# Patient Record
Sex: FEMALE | Race: WHITE | Employment: FULL TIME | URBAN - METROPOLITAN AREA
[De-identification: names, ages, dates, MRNs, and addresses within clinical notes are randomized per-mention and may not be internally consistent; named-entity substitution may affect disease eponyms.]

---

## 2021-03-20 ENCOUNTER — HOSPITAL ENCOUNTER (EMERGENCY)
Age: 26
Discharge: HOME | End: 2021-03-20
Attending: EMERGENCY MEDICINE
Payer: COMMERCIAL

## 2021-03-20 ENCOUNTER — APPOINTMENT (OUTPATIENT)
Dept: ULTRASOUND IMAGING | Age: 26
End: 2021-03-20
Payer: COMMERCIAL

## 2021-03-20 VITALS
SYSTOLIC BLOOD PRESSURE: 118 MMHG | HEART RATE: 81 BPM | TEMPERATURE: 98.5 F | HEIGHT: 60 IN | RESPIRATION RATE: 16 BRPM | WEIGHT: 105 LBS | DIASTOLIC BLOOD PRESSURE: 81 MMHG | OXYGEN SATURATION: 99 % | BODY MASS INDEX: 20.62 KG/M2

## 2021-03-20 DIAGNOSIS — O26.90 COMPLICATION OF PREGNANCY, ANTEPARTUM: ICD-10-CM

## 2021-03-20 DIAGNOSIS — R10.9 LEFT FLANK PAIN: Primary | ICD-10-CM

## 2021-03-20 LAB
AMORPHOUS: ABNORMAL
ANION GAP SERPL CALCULATED.3IONS-SCNC: 7 MMOL/L (ref 7–16)
BACTERIA: ABNORMAL /HPF
BASOPHILS ABSOLUTE: 0.03 E9/L (ref 0–0.2)
BASOPHILS RELATIVE PERCENT: 0.5 % (ref 0–2)
BILIRUBIN URINE: NEGATIVE
BLOOD, URINE: NEGATIVE
BUN BLDV-MCNC: 10 MG/DL (ref 6–20)
CALCIUM SERPL-MCNC: 8.8 MG/DL (ref 8.6–10.2)
CHLORIDE BLD-SCNC: 107 MMOL/L (ref 98–107)
CLARITY: ABNORMAL
CO2: 24 MMOL/L (ref 22–29)
COLOR: YELLOW
CREAT SERPL-MCNC: 0.5 MG/DL (ref 0.5–1)
EOSINOPHILS ABSOLUTE: 0.24 E9/L (ref 0.05–0.5)
EOSINOPHILS RELATIVE PERCENT: 3.7 % (ref 0–6)
GFR AFRICAN AMERICAN: >60
GFR NON-AFRICAN AMERICAN: >60 ML/MIN/1.73
GLUCOSE BLD-MCNC: 100 MG/DL (ref 74–99)
GLUCOSE URINE: NEGATIVE MG/DL
GONADOTROPIN, CHORIONIC (HCG) QUANT: ABNORMAL MIU/ML
HCT VFR BLD CALC: 34.1 % (ref 34–48)
HEMOGLOBIN: 11.5 G/DL (ref 11.5–15.5)
IMMATURE GRANULOCYTES #: 0.02 E9/L
IMMATURE GRANULOCYTES %: 0.3 % (ref 0–5)
KETONES, URINE: NEGATIVE MG/DL
LEUKOCYTE ESTERASE, URINE: NEGATIVE
LYMPHOCYTES ABSOLUTE: 1.88 E9/L (ref 1.5–4)
LYMPHOCYTES RELATIVE PERCENT: 28.7 % (ref 20–42)
MCH RBC QN AUTO: 31.7 PG (ref 26–35)
MCHC RBC AUTO-ENTMCNC: 33.7 % (ref 32–34.5)
MCV RBC AUTO: 93.9 FL (ref 80–99.9)
MONOCYTES ABSOLUTE: 0.41 E9/L (ref 0.1–0.95)
MONOCYTES RELATIVE PERCENT: 6.3 % (ref 2–12)
NEUTROPHILS ABSOLUTE: 3.97 E9/L (ref 1.8–7.3)
NEUTROPHILS RELATIVE PERCENT: 60.5 % (ref 43–80)
NITRITE, URINE: NEGATIVE
PDW BLD-RTO: 12.7 FL (ref 11.5–15)
PH UA: 8 (ref 5–9)
PLATELET # BLD: 284 E9/L (ref 130–450)
PMV BLD AUTO: 9.5 FL (ref 7–12)
POTASSIUM SERPL-SCNC: 4.1 MMOL/L (ref 3.5–5)
PROTEIN UA: NEGATIVE MG/DL
RBC # BLD: 3.63 E12/L (ref 3.5–5.5)
RBC UA: ABNORMAL /HPF (ref 0–2)
SODIUM BLD-SCNC: 138 MMOL/L (ref 132–146)
SPECIFIC GRAVITY UA: 1.01 (ref 1–1.03)
UROBILINOGEN, URINE: 0.2 E.U./DL
WBC # BLD: 6.6 E9/L (ref 4.5–11.5)
WBC UA: ABNORMAL /HPF (ref 0–5)

## 2021-03-20 PROCEDURE — 85025 COMPLETE CBC W/AUTO DIFF WBC: CPT

## 2021-03-20 PROCEDURE — 84702 CHORIONIC GONADOTROPIN TEST: CPT

## 2021-03-20 PROCEDURE — 76801 OB US < 14 WKS SINGLE FETUS: CPT

## 2021-03-20 PROCEDURE — 6370000000 HC RX 637 (ALT 250 FOR IP): Performed by: EMERGENCY MEDICINE

## 2021-03-20 PROCEDURE — 99283 EMERGENCY DEPT VISIT LOW MDM: CPT

## 2021-03-20 PROCEDURE — 80048 BASIC METABOLIC PNL TOTAL CA: CPT

## 2021-03-20 PROCEDURE — 36415 COLL VENOUS BLD VENIPUNCTURE: CPT

## 2021-03-20 PROCEDURE — 81001 URINALYSIS AUTO W/SCOPE: CPT

## 2021-03-20 RX ORDER — ACETAMINOPHEN 325 MG/1
650 TABLET ORAL ONCE
Status: COMPLETED | OUTPATIENT
Start: 2021-03-20 | End: 2021-03-20

## 2021-03-20 RX ADMIN — ACETAMINOPHEN 650 MG: 325 TABLET ORAL at 11:43

## 2021-03-20 ASSESSMENT — PAIN SCALES - GENERAL
PAINLEVEL_OUTOF10: 6
PAINLEVEL_OUTOF10: 6

## 2021-03-20 ASSESSMENT — PAIN DESCRIPTION - DESCRIPTORS: DESCRIPTORS: ACHING

## 2021-03-20 ASSESSMENT — PAIN DESCRIPTION - LOCATION: LOCATION: ABDOMEN

## 2021-03-20 NOTE — ED PROVIDER NOTES
Department of Emergency Medicine   ED  Provider Note  Admit Date/RoomTime: 3/20/2021 10:44 AM  ED Room: 10/10          History of Present Illness:  3/20/21, Time: 11:24 AM EDT  Chief Complaint   Patient presents with    Abdominal Cramping     14 weeks pregnant, started yesterday. Denies N/V/D. Denies bleeding. Joel Mederos is a 31 y.o. female presenting to the ED for left abdominal pain in the presence of pregnancy, beginning 1 day. The complaint has been intermittent, moderate in severity, and worsened by movement and twisting. Patient presents for left lower abdominal pain/left flank pain. She is 14 weeks pregnant by dates, , follows with Dr. Gill Mclean. Reports she was lifting something heavy yesterday when she had sudden onset of left flank pain. Denies vaginal discharge vaginal bleeding. She is unsure of her blood type. Denies dysuria or hematuria. Is not take anything for it yet this morning. Has not yet felt the baby kick and move. Believes she had an ultrasound the office 2-1/2 to 3 weeks ago. Review  records shows a stroke a blood type of O+. Review of Systems:   Pertinent positives and negatives are stated within HPI, all other systems reviewed and are negative.        --------------------------------------------- PAST HISTORY ---------------------------------------------  Past Medical History:  has a past medical history of Anxiety, Benign brain tumor ([facility] [address] Depression, and Herpes. Past Surgical History:  has a past surgical history that includes Tonsillectomy and Adenoidectomy. Social History:  reports that she has been smoking cigarettes. She has been smoking about 0.25 packs per day. She has never used smokeless tobacco. She reports current alcohol use. She reports that she does not use drugs. Family History: family history includes Cancer in her paternal grandfather; Stroke in her mother. . Unless otherwise noted, family history is non contributory    The patients home medications have been reviewed. Allergies: Patient has no known allergies. ---------------------------------------------------PHYSICAL EXAM--------------------------------------    Constitutional/General: Alert and oriented x3  Head: Normocephalic and atraumatic  Eyes: PERRL, EOMI, sclera non icteric  Mouth: Oropharynx clear, handling secretions, no trismus, no asymmetry of the posterior oropharynx or uvular edema  Neck: Supple, full ROM, no stridor, no meningeal signs  Respiratory: Lungs clear to auscultation bilaterally,Not in respiratory distress  Cardiovascular:  Regular rate. Regular rhythm. 2+ distal pulses. Equal extremity pulses. Chest: No chest wall tenderness  GI:  Abdomen Soft, gravid abdomen. Tenderness along the left mid abdomen left lower quadrant. There is no CVA tenderness no rebound, guarding, or rigidity. Musculoskeletal: Moves all extremities x 4. Warm and well perfused, no clubbing, cyanosis, or edema. Capillary refill <3 seconds  Integument: skin warm and dry. No rashes. Neurologic: GCS 15, no focal deficits, symmetric strength 5/5 in the upper and lower extremities bilaterally  Psychiatric: Normal Affect       -------------------------------------------------- RESULTS -------------------------------------------------  I have personally reviewed all laboratory and imaging results for this patient. Results are listed below.      LABS: (Lab results interpreted by me)  Results for orders placed or performed during the hospital encounter of 03/20/21   Urinalysis   Result Value Ref Range    Color, UA Yellow Straw/Yellow    Clarity, UA CLOUDY (A) Clear    Glucose, Ur Negative Negative mg/dL    Bilirubin Urine Negative Negative    Ketones, Urine Negative Negative mg/dL    Specific Gravity, UA 1.015 1.005 - 1.030    Blood, Urine Negative Negative    pH, UA 8.0 5.0 - 9.0    Protein, UA Negative Negative mg/dL    Urobilinogen, Urine 0.2 <2.0 E.U./dL Nitrite, Urine Negative Negative    Leukocyte Esterase, Urine Negative Negative   CBC Auto Differential   Result Value Ref Range    WBC 6.6 4.5 - 11.5 E9/L    RBC 3.63 3.50 - 5.50 E12/L    Hemoglobin 11.5 11.5 - 15.5 g/dL    Hematocrit 34.1 34.0 - 48.0 %    MCV 93.9 80.0 - 99.9 fL    MCH 31.7 26.0 - 35.0 pg    MCHC 33.7 32.0 - 34.5 %    RDW 12.7 11.5 - 15.0 fL    Platelets 633 493 - 931 E9/L    MPV 9.5 7.0 - 12.0 fL    Neutrophils % 60.5 43.0 - 80.0 %    Immature Granulocytes % 0.3 0.0 - 5.0 %    Lymphocytes % 28.7 20.0 - 42.0 %    Monocytes % 6.3 2.0 - 12.0 %    Eosinophils % 3.7 0.0 - 6.0 %    Basophils % 0.5 0.0 - 2.0 %    Neutrophils Absolute 3.97 1.80 - 7.30 E9/L    Immature Granulocytes # 0.02 E9/L    Lymphocytes Absolute 1.88 1.50 - 4.00 E9/L    Monocytes Absolute 0.41 0.10 - 0.95 E9/L    Eosinophils Absolute 0.24 0.05 - 0.50 E9/L    Basophils Absolute 0.03 0.00 - 0.20 Q8/C   Basic Metabolic Panel   Result Value Ref Range    Sodium 138 132 - 146 mmol/L    Potassium 4.1 3.5 - 5.0 mmol/L    Chloride 107 98 - 107 mmol/L    CO2 24 22 - 29 mmol/L    Anion Gap 7 7 - 16 mmol/L    Glucose 100 (H) 74 - 99 mg/dL    BUN 10 6 - 20 mg/dL    CREATININE 0.5 0.5 - 1.0 mg/dL    GFR Non-African American >60 >=60 mL/min/1.73    GFR African American >60     Calcium 8.8 8.6 - 10.2 mg/dL   hCG, quantitative, pregnancy   Result Value Ref Range    hCG Quant 29187.0 (H) <10 mIU/mL   Microscopic Urinalysis   Result Value Ref Range    WBC, UA NONE 0 - 5 /HPF    RBC, UA NONE 0 - 2 /HPF    Bacteria, UA FEW (A) None Seen /HPF    Amorphous, UA MANY    ,       RADIOLOGY:  Interpreted by Radiologist unless otherwise specified  US OB LESS THAN 14 WEEKS SINGLE OR FIRST GESTATION   Final Result   Single live intrauterine gestation estimated at 13 weeks 4 days                          ------------------------- NURSING NOTES AND VITALS REVIEWED ---------------------------   The nursing notes within the ED encounter and vital signs as below have been reviewed by myself  /81   Pulse 81   Temp 98.5 °F (36.9 °C) (Oral)   Resp 16   Ht 5' (1.524 m)   Wt 105 lb (47.6 kg)   SpO2 99%   BMI 20.51 kg/m²     Oxygen Saturation Interpretation: Normal       The patients available past medical records and past encounters were reviewed. ------------------------------ ED COURSE/MEDICAL DECISION MAKING----------------------  Medications   acetaminophen (TYLENOL) tablet 650 mg (650 mg Oral Given 3/20/21 1143)                    Medical Decision Making:     I, Dr. Thomas Marie am the primary provider of record    Patient showed improvement after Tylenol. Work-up reassuring. Will discharge home with outpatient follow-up return Jovanna signs or symptoms. Re-Evaluations:          Re-evaluation. Patients symptoms are improving  Repeat physical examination is improved        This patient's ED course included: a personal history and physicial examination, re-evaluation prior to disposition and complex medical decision making and emergency management    This patient has remained hemodynamically stable during their ED course. Counseling: The emergency provider has spoken with the patient and spouse/SO and discussed todays results, in addition to providing specific details for the plan of care and counseling regarding the diagnosis and prognosis. Questions are answered at this time and they are agreeable with the plan.       --------------------------------- IMPRESSION AND DISPOSITION ---------------------------------    IMPRESSION  1. Left flank pain    2. Complication of pregnancy, antepartum        DISPOSITION  Disposition: Discharge to home  Patient condition is stable        NOTE: This report was transcribed using voice recognition software.  Every effort was made to ensure accuracy; however, inadvertent computerized transcription errors may be present        Karen Rangel DO  03/20/21 5317

## 2021-05-24 ENCOUNTER — ROUTINE PRENATAL (OUTPATIENT)
Dept: OBGYN CLINIC | Age: 26
End: 2021-05-24
Payer: COMMERCIAL

## 2021-05-24 ENCOUNTER — ANCILLARY PROCEDURE (OUTPATIENT)
Dept: OBGYN CLINIC | Age: 26
End: 2021-05-24
Payer: COMMERCIAL

## 2021-05-24 VITALS
HEART RATE: 80 BPM | BODY MASS INDEX: 25.64 KG/M2 | WEIGHT: 130.6 LBS | HEIGHT: 60 IN | DIASTOLIC BLOOD PRESSURE: 76 MMHG | SYSTOLIC BLOOD PRESSURE: 123 MMHG

## 2021-05-24 DIAGNOSIS — Z36.3 ENCOUNTER FOR ANTENATAL SCREENING FOR MALFORMATION USING ULTRASOUND: ICD-10-CM

## 2021-05-24 DIAGNOSIS — Z86.19 HISTORY OF HERPES GENITALIS: ICD-10-CM

## 2021-05-24 DIAGNOSIS — Z03.75 SUSPECTED SHORTENING OF CERVIX NOT FOUND: ICD-10-CM

## 2021-05-24 DIAGNOSIS — F12.90 MARIJUANA USE: ICD-10-CM

## 2021-05-24 DIAGNOSIS — Z3A.22 22 WEEKS GESTATION OF PREGNANCY: ICD-10-CM

## 2021-05-24 DIAGNOSIS — O09.891 MEDICATION EXPOSURE DURING FIRST TRIMESTER OF PREGNANCY: Primary | ICD-10-CM

## 2021-05-24 LAB
GLUCOSE URINE, POC: NEGATIVE
PROTEIN UA: NEGATIVE

## 2021-05-24 PROCEDURE — 99203 OFFICE O/P NEW LOW 30 MIN: CPT | Performed by: OBSTETRICS & GYNECOLOGY

## 2021-05-24 PROCEDURE — 81002 URINALYSIS NONAUTO W/O SCOPE: CPT | Performed by: OBSTETRICS & GYNECOLOGY

## 2021-05-24 PROCEDURE — G8427 DOCREV CUR MEDS BY ELIG CLIN: HCPCS | Performed by: OBSTETRICS & GYNECOLOGY

## 2021-05-24 PROCEDURE — 76811 OB US DETAILED SNGL FETUS: CPT | Performed by: OBSTETRICS & GYNECOLOGY

## 2021-05-24 PROCEDURE — G8419 CALC BMI OUT NRM PARAM NOF/U: HCPCS | Performed by: OBSTETRICS & GYNECOLOGY

## 2021-05-24 PROCEDURE — 4004F PT TOBACCO SCREEN RCVD TLK: CPT | Performed by: OBSTETRICS & GYNECOLOGY

## 2021-05-24 PROCEDURE — 76817 TRANSVAGINAL US OBSTETRIC: CPT | Performed by: OBSTETRICS & GYNECOLOGY

## 2021-05-24 NOTE — LETTER
21    Nohemi Goldberg MD  [address], [address],  [facility] [place] [address]     RE:  Daniel Serna  : 1995   AGE: 31 y.o. This report has been created using voice recognition software. It may contain errors which are inherent in voice recognition technology. Dear Dr. Jazmyn Dumont:    Elijah Harry had an appointment today for the following indications:    Patient Active Problem List   Diagnosis    History of benign brain tumor in 2013, not surgically resected    Encounter for  screening for malformation using ultrasound    Medication exposure during first trimester of pregnancy    Suspected shortening of cervix not found    History of herpes genitalis    Marijuana use     Elijah Harry is a 31 y.o. female, who is G2(0,0,,1,0). She has an Estimated Date of Delivery: 21 based on her previous ultrasound assessment. She is currently 22 weeks 6 days gestation based on that assessment. The patient has a history of marijuana use, cigarette smoking, verapamil use in the first trimester, and a history of a benign brain tumor not requiring surgical intervention. She did not know the specific nature of the brain tumor and no records were available for review at the time for assessment. She currently sees Dr. Cammie Yuan, a neurologist in [facility]. Records will be requested. The patient uses marijuana 2-3 times weekly. She was advised of the importance of not using illicit drugs, especially during her pregnancy. In-utero exposure to marijuana in some studies has been associated with an increased risk for neurobehavioral abnormalities, fetal growth abnormalities which may lead to low birth weight and ongoing growth problems following delivery and an increased risk for developing leukemia in children. The patient is a cigarette smoker. I advised the patient of the importance of discontinuing her smoking activity.  She was advised of the increased risk of  morbidity mosaicism cannot be distinguished, and in some cases, not detected by this method. A negative test does not eliminate the possibility of fetal chromosome abnormalities in regions tested or and other areas of the genome. The tests cannot rule out other genetic diseases or birth defects, including open neural tube defects. The patient should be placed on suppressive therapy for genital herpes once she reaches viability. This helps to reduce the risk of an active infection at the time of delivery. Suppressive therapy does not eliminate the risk of herpes virus transmission to the fetus. Many patients have asymptomatic genital herpes infections without visible evidence of infection. A fetal ultrasound evaluation was performed and a detailed report included in the EMR under the imaging tab. A living cottrell intrauterine fetus was identified, in the cephalic presentation, with normal fetal heart motion and normal fetal motion. The amniotic fluid volume was within normal limits. The placenta was anterior. The biometric measurements were consistent with the patient's established dating parameters, within the limits of error the test at the current gestational age. The estimated fetal weight was at the 46 growth percentile. No apparent gross fetal anatomic abnormalities were identified in the areas which were visualized.                      GENETIC SCREENING/TERATOLOGY COUNSELING                  (Includes patient, FTB, and any affected family members)    Patient Age > 35 Years NO   Thalassemia ( MVC<80) NO   Congential Heart Defect NO   Neural Tube Defect NO   Brandon-Sachs NO   Sickle Cell Disease NO   Sickle Cell Trait NO   Sickle C Disease or Trait NO   Hemophilia NO   Muscular Dystrophy NO   Cystic Fibrosis NO   Wes Disease NO   Autism NO   Mental Retardation NO   History of Fragile X NO   Maternal Diabetes NO   Other Genetic Disease or Syndrome NO   Previous Child With Congenital Abnormality Not follow-up assessment when they occur. I requested the patient return for a follow-up assessment in 8 weeks unless there is a clinical reason for her to return prior to that time. She is to call if she has any problems or questions prior to her next visit. Further evaluation and management will be dependent on her clinical presentation and the results of her testing. The patient is to continue to follow with you in your office for ongoing obstetric care. The total time in minutes spent with the patient, reviewing medical records, reviewing imaging studies, performing ultrasonic imaging, reviewing laboratory testing, and documenting information was 31 minutes, of which, 50% of the time was spent in patient education, counseling, and coordinating care with the patient, her provider, and/or her family. A complete medical, surgical, obstetric, family, and genetic history was obtained. I reviewed with the patient the results of her NIPT, including the limitations of the testing. I discussed with her her use of marijuana and cigarette smoking in pregnancy. I advised her of her increased risk for developing depressive symptomatology during her pregnancy and in the postpartum period because of her history of depression. I advised the patient that without knowing the specific nature of the brain tumor that she had, a specific recommendations for management in pregnancy could be made. Based on the CT scan performed on October 24, 2016, without contrast, there was no apparent mass-effect, edema or intracranial hemorrhage. A CT scan may not show all intracranial mass lesions. I advised the patient to continue to follow with Dr. Janine Ames for ongoing evaluation and management of this problem. I answered all of her questions to her satisfaction. I asked her to call if she had any additional questions prior to her next visit.       If you have any questions regarding her management, please contact me at your convenience and thank you for allowing me to participate in her care.     Sincerely,        Sada Vera MD, MS, Abel Lozano RDCS, RDMS, RVT  Director [address] [place] [address]  [phone]

## 2021-09-02 ENCOUNTER — HOSPITAL ENCOUNTER (OUTPATIENT)
Age: 26
Discharge: HOME | End: 2021-09-02
Attending: OBSTETRICS & GYNECOLOGY | Admitting: OBSTETRICS & GYNECOLOGY
Payer: COMMERCIAL

## 2021-09-02 VITALS
BODY MASS INDEX: 25.51 KG/M2 | SYSTOLIC BLOOD PRESSURE: 130 MMHG | RESPIRATION RATE: 16 BRPM | DIASTOLIC BLOOD PRESSURE: 77 MMHG | TEMPERATURE: 98.2 F | HEIGHT: 60 IN | HEART RATE: 81 BPM

## 2021-09-02 PROBLEM — Z3A.37 37 WEEKS GESTATION OF PREGNANCY: Status: ACTIVE | Noted: 2021-09-02

## 2021-09-02 PROBLEM — Z34.03 PRIMIPARITY, THIRD TRIMESTER: Status: ACTIVE | Noted: 2021-09-02

## 2021-09-02 LAB
ALBUMIN SERPL-MCNC: 3.9 G/DL (ref 3.5–5.2)
ALP BLD-CCNC: 204 U/L (ref 35–104)
ALT SERPL-CCNC: 16 U/L (ref 0–32)
ANION GAP SERPL CALCULATED.3IONS-SCNC: 13 MMOL/L (ref 7–16)
AST SERPL-CCNC: 17 U/L (ref 0–31)
BACTERIA: ABNORMAL /HPF
BILIRUB SERPL-MCNC: <0.2 MG/DL (ref 0–1.2)
BILIRUBIN URINE: NEGATIVE
BLOOD, URINE: NEGATIVE
BUN BLDV-MCNC: 7 MG/DL (ref 6–20)
CALCIUM SERPL-MCNC: 9.5 MG/DL (ref 8.6–10.2)
CHLORIDE BLD-SCNC: 102 MMOL/L (ref 98–107)
CLARITY: CLEAR
CO2: 20 MMOL/L (ref 22–29)
COLOR: YELLOW
CREAT SERPL-MCNC: 0.6 MG/DL (ref 0.5–1)
EPITHELIAL CELLS, UA: ABNORMAL /HPF
GFR AFRICAN AMERICAN: >60
GFR NON-AFRICAN AMERICAN: >60 ML/MIN/1.73
GLUCOSE BLD-MCNC: 102 MG/DL (ref 74–99)
GLUCOSE URINE: NEGATIVE MG/DL
HCT VFR BLD CALC: 34.9 % (ref 34–48)
HEMOGLOBIN: 11.7 G/DL (ref 11.5–15.5)
KETONES, URINE: NEGATIVE MG/DL
LEUKOCYTE ESTERASE, URINE: NEGATIVE
MCH RBC QN AUTO: 31.4 PG (ref 26–35)
MCHC RBC AUTO-ENTMCNC: 33.5 % (ref 32–34.5)
MCV RBC AUTO: 93.6 FL (ref 80–99.9)
NITRITE, URINE: NEGATIVE
PDW BLD-RTO: 13.5 FL (ref 11.5–15)
PH UA: 7 (ref 5–9)
PLATELET # BLD: 373 E9/L (ref 130–450)
PMV BLD AUTO: 9.7 FL (ref 7–12)
POTASSIUM SERPL-SCNC: 4.2 MMOL/L (ref 3.5–5)
PROTEIN UA: NEGATIVE MG/DL
RBC # BLD: 3.73 E12/L (ref 3.5–5.5)
RBC UA: ABNORMAL /HPF (ref 0–2)
SODIUM BLD-SCNC: 135 MMOL/L (ref 132–146)
SPECIFIC GRAVITY UA: 1.01 (ref 1–1.03)
TOTAL PROTEIN: 7.3 G/DL (ref 6.4–8.3)
URIC ACID, SERUM: 3.9 MG/DL (ref 2.4–5.7)
UROBILINOGEN, URINE: 0.2 E.U./DL
WBC # BLD: 14.2 E9/L (ref 4.5–11.5)
WBC UA: ABNORMAL /HPF (ref 0–5)

## 2021-09-02 PROCEDURE — 84550 ASSAY OF BLOOD/URIC ACID: CPT

## 2021-09-02 PROCEDURE — 99211 OFF/OP EST MAY X REQ PHY/QHP: CPT

## 2021-09-02 PROCEDURE — 85027 COMPLETE CBC AUTOMATED: CPT

## 2021-09-02 PROCEDURE — 80053 COMPREHEN METABOLIC PANEL: CPT

## 2021-09-02 PROCEDURE — 36415 COLL VENOUS BLD VENIPUNCTURE: CPT

## 2021-09-02 PROCEDURE — 81001 URINALYSIS AUTO W/SCOPE: CPT

## 2021-09-02 NOTE — H&P
Department of Obstetrics and Gynecology  Attending Obstetrics History and Physical      HISTORY OF PRESENT ILLNESS:      The patient is a 31 y.o.  1 parity 0 at 37 weeks 3 days. Sent from office for elevated bp. No pih symptoms. Denies headaches or visual changes. Estimated Due Date:  21  Contractions:  Yes  Leaking of fluid: no  nfm  Blleeding:  No    PRENATAL CARE:    Complications: No      Active Problems:    37 weeks gestation of pregnancy  Resolved Problems:    * No resolved hospital problems. *        PAST OB HISTORY    OB History    Para Term  AB Living   1             SAB TAB Ectopic Molar Multiple Live Births                    # Outcome Date GA Lbr Chavez/2nd Weight Sex Delivery Anes PTL Lv   1 Current                    Pre-eclampsia:  No      :  No      D & C:  No      Cerclage:  No      LEEP:  No      Myomectomy:  No       Labor: No    Past Medical History:    Past Medical History:   Diagnosis Date    Anxiety     Benign brain tumor ([facility] [address]     gets annual MRI    Depression     Herpes         Past Surgical History:    Past Surgical History:   Procedure Laterality Date    ADENOIDECTOMY      TONSILLECTOMY          Past Family History:  Family History   Problem Relation Age of Onset    Stroke Mother     Heart Attack Mother     Cancer Paternal Grandfather         liver    Cancer Maternal Grandfather     Kidney Disease Father        ROS:  Const: No fever, chills, night sweats, no recent unexplained weight gain/loss  HEENT: No blurred vision, double vision; no ear problems; no sore throat, congestion; no running nose. Resp: No cough, no sputum, no pleuritic chest pain, no sob  Cardio: No chest pain, no exertional dyspnea, no PND, no orthopnea, no palpitation, no leg swelling. GI: No dysphagia, no reflux; no abdominal pain, no n/v; no c/d.  No hematochezia    : No dysuria, no frequency, hesitancy; no hematuria  MSK: no joint pain, no myalgia, no change in ROM  Neuro: no focal weakness in extremities, no slurred speech, no double vision, no numbness or tingling in extremities  Endo: no heat/cold intolerance, no polyphagia, polydipsia or polyuria  Hem: no increased bleeding, no bruising, no lymphadenopathy  Skin: no skin changes  Psych: no depressed mood, no suicidal ideation    Social History:     reports that she has quit smoking. Her smoking use included cigarettes. She has a 2.00 pack-year smoking history. She has never used smokeless tobacco. She reports previous alcohol use. She reports current drug use. Drug: Marijuana. Medications Prior to Admission:  Medications Prior to Admission: Prenatal MV-Min-Fe Fum-FA-DHA (PRENATAL 1 PO), Take by mouth    Allergies:  Patient has no known allergies. PHYSICAL EXAM:  /77   Pulse 81   Temp 98.2 °F (36.8 °C) (Oral)   Resp 16   Ht 5' (1.524 m)   LMP 12/14/2020   BMI 25.51 kg/m²    Xw=304/75, 131/77,141/99  General appearance: Comfortable  Lungs:  CTA   Heart:  Regular Rhythm  Abdomen:  Soft, non-tender, gravid  Fetal heart rate:   reactive    Contraction frequency:   Some ctx's doesn't feel  Membranes:  Intact      ASSESSMENT     IUP at 37 weeks.     Elevated bp in office         Plan: orders called in by dr Pierce Schmidt        Electronically signed by Devante Li MD on 9/2/2021 at 6:14 PM

## 2021-09-02 NOTE — PROGRESS NOTES
Patient sent in from Dr. Gaming [facility] office for elevated BPs. Orders received. Lab work drawn and sent. BPs cycling every 15 mins.

## 2021-09-11 ENCOUNTER — ANESTHESIA (OUTPATIENT)
Dept: LABOR AND DELIVERY | Age: 26
DRG: 560 | End: 2021-09-11
Payer: COMMERCIAL

## 2021-09-11 ENCOUNTER — HOSPITAL ENCOUNTER (INPATIENT)
Age: 26
LOS: 2 days | Discharge: HOME | DRG: 560 | End: 2021-09-13
Attending: OBSTETRICS & GYNECOLOGY | Admitting: OBSTETRICS & GYNECOLOGY
Payer: COMMERCIAL

## 2021-09-11 ENCOUNTER — ANESTHESIA EVENT (OUTPATIENT)
Dept: LABOR AND DELIVERY | Age: 26
DRG: 560 | End: 2021-09-11
Payer: COMMERCIAL

## 2021-09-11 PROBLEM — Z3A.38 38 WEEKS GESTATION OF PREGNANCY: Status: ACTIVE | Noted: 2021-09-11

## 2021-09-11 LAB
ABO/RH: NORMAL
ANTIBODY SCREEN: NORMAL
HCT VFR BLD CALC: 23.9 % (ref 34–48)
HEMOGLOBIN: 8 G/DL (ref 11.5–15.5)
MCH RBC QN AUTO: 31.9 PG (ref 26–35)
MCHC RBC AUTO-ENTMCNC: 33.5 % (ref 32–34.5)
MCV RBC AUTO: 95.2 FL (ref 80–99.9)
PDW BLD-RTO: 13.6 FL (ref 11.5–15)
PLATELET # BLD: 157 E9/L (ref 130–450)
PMV BLD AUTO: 11.3 FL (ref 7–12)
RBC # BLD: 2.51 E12/L (ref 3.5–5.5)
WBC # BLD: 13.4 E9/L (ref 4.5–11.5)

## 2021-09-11 PROCEDURE — 10907ZC DRAINAGE OF AMNIOTIC FLUID, THERAPEUTIC FROM PRODUCTS OF CONCEPTION, VIA NATURAL OR ARTIFICIAL OPENING: ICD-10-PCS | Performed by: OBSTETRICS & GYNECOLOGY

## 2021-09-11 PROCEDURE — 86850 RBC ANTIBODY SCREEN: CPT

## 2021-09-11 PROCEDURE — 1220000000 HC SEMI PRIVATE OB R&B

## 2021-09-11 PROCEDURE — 36415 COLL VENOUS BLD VENIPUNCTURE: CPT

## 2021-09-11 PROCEDURE — 6370000000 HC RX 637 (ALT 250 FOR IP): Performed by: OBSTETRICS & GYNECOLOGY

## 2021-09-11 PROCEDURE — 99024 POSTOP FOLLOW-UP VISIT: CPT | Performed by: OBSTETRICS & GYNECOLOGY

## 2021-09-11 PROCEDURE — 2500000003 HC RX 250 WO HCPCS: Performed by: ANESTHESIOLOGY

## 2021-09-11 PROCEDURE — 86900 BLOOD TYPING SEROLOGIC ABO: CPT

## 2021-09-11 PROCEDURE — 6360000002 HC RX W HCPCS: Performed by: OBSTETRICS & GYNECOLOGY

## 2021-09-11 PROCEDURE — 7200000001 HC VAGINAL DELIVERY

## 2021-09-11 PROCEDURE — 88307 TISSUE EXAM BY PATHOLOGIST: CPT

## 2021-09-11 PROCEDURE — 2500000003 HC RX 250 WO HCPCS

## 2021-09-11 PROCEDURE — 85027 COMPLETE CBC AUTOMATED: CPT

## 2021-09-11 PROCEDURE — 2580000003 HC RX 258: Performed by: OBSTETRICS & GYNECOLOGY

## 2021-09-11 PROCEDURE — 3700000025 EPIDURAL BLOCK: Performed by: ANESTHESIOLOGY

## 2021-09-11 PROCEDURE — 6360000002 HC RX W HCPCS

## 2021-09-11 PROCEDURE — 86901 BLOOD TYPING SEROLOGIC RH(D): CPT

## 2021-09-11 RX ORDER — SODIUM CHLORIDE 9 MG/ML
25 INJECTION, SOLUTION INTRAVENOUS PRN
Status: DISCONTINUED | OUTPATIENT
Start: 2021-09-11 | End: 2021-09-13 | Stop reason: HOSPADM

## 2021-09-11 RX ORDER — DOCUSATE SODIUM 100 MG/1
100 CAPSULE, LIQUID FILLED ORAL 2 TIMES DAILY
Status: DISCONTINUED | OUTPATIENT
Start: 2021-09-11 | End: 2021-09-13 | Stop reason: HOSPADM

## 2021-09-11 RX ORDER — NALOXONE HYDROCHLORIDE 0.4 MG/ML
0.4 INJECTION, SOLUTION INTRAMUSCULAR; INTRAVENOUS; SUBCUTANEOUS PRN
Status: DISCONTINUED | OUTPATIENT
Start: 2021-09-11 | End: 2021-09-11

## 2021-09-11 RX ORDER — ONDANSETRON 2 MG/ML
4 INJECTION INTRAMUSCULAR; INTRAVENOUS EVERY 6 HOURS PRN
Status: DISCONTINUED | OUTPATIENT
Start: 2021-09-11 | End: 2021-09-11

## 2021-09-11 RX ORDER — ACETAMINOPHEN 325 MG/1
650 TABLET ORAL EVERY 4 HOURS PRN
Status: DISCONTINUED | OUTPATIENT
Start: 2021-09-11 | End: 2021-09-13 | Stop reason: HOSPADM

## 2021-09-11 RX ORDER — HYDROCODONE BITARTRATE AND ACETAMINOPHEN 5; 325 MG/1; MG/1
1 TABLET ORAL EVERY 4 HOURS PRN
Status: DISCONTINUED | OUTPATIENT
Start: 2021-09-11 | End: 2021-09-13 | Stop reason: HOSPADM

## 2021-09-11 RX ORDER — HYDROCODONE BITARTRATE AND ACETAMINOPHEN 5; 325 MG/1; MG/1
2 TABLET ORAL EVERY 4 HOURS PRN
Status: DISCONTINUED | OUTPATIENT
Start: 2021-09-11 | End: 2021-09-13 | Stop reason: HOSPADM

## 2021-09-11 RX ORDER — SODIUM CHLORIDE, SODIUM LACTATE, POTASSIUM CHLORIDE, CALCIUM CHLORIDE 600; 310; 30; 20 MG/100ML; MG/100ML; MG/100ML; MG/100ML
INJECTION, SOLUTION INTRAVENOUS CONTINUOUS
Status: DISCONTINUED | OUTPATIENT
Start: 2021-09-11 | End: 2021-09-13 | Stop reason: HOSPADM

## 2021-09-11 RX ORDER — FENTANYL CITRATE 50 UG/ML
INJECTION, SOLUTION INTRAMUSCULAR; INTRAVENOUS PRN
Status: DISCONTINUED | OUTPATIENT
Start: 2021-09-11 | End: 2021-09-11 | Stop reason: SDUPTHER

## 2021-09-11 RX ORDER — BUPIVACAINE HYDROCHLORIDE 2.5 MG/ML
INJECTION, SOLUTION EPIDURAL; INFILTRATION; INTRACAUDAL PRN
Status: DISCONTINUED | OUTPATIENT
Start: 2021-09-11 | End: 2021-09-11 | Stop reason: SDUPTHER

## 2021-09-11 RX ORDER — SODIUM CHLORIDE, SODIUM LACTATE, POTASSIUM CHLORIDE, AND CALCIUM CHLORIDE .6; .31; .03; .02 G/100ML; G/100ML; G/100ML; G/100ML
500 INJECTION, SOLUTION INTRAVENOUS PRN
Status: DISCONTINUED | OUTPATIENT
Start: 2021-09-11 | End: 2021-09-11

## 2021-09-11 RX ORDER — SODIUM CHLORIDE, SODIUM LACTATE, POTASSIUM CHLORIDE, CALCIUM CHLORIDE 600; 310; 30; 20 MG/100ML; MG/100ML; MG/100ML; MG/100ML
INJECTION, SOLUTION INTRAVENOUS CONTINUOUS
Status: DISCONTINUED | OUTPATIENT
Start: 2021-09-11 | End: 2021-09-11

## 2021-09-11 RX ORDER — SODIUM CHLORIDE 0.9 % (FLUSH) 0.9 %
5-40 SYRINGE (ML) INJECTION PRN
Status: DISCONTINUED | OUTPATIENT
Start: 2021-09-11 | End: 2021-09-13 | Stop reason: HOSPADM

## 2021-09-11 RX ORDER — IBUPROFEN 800 MG/1
800 TABLET ORAL EVERY 8 HOURS PRN
Status: DISCONTINUED | OUTPATIENT
Start: 2021-09-11 | End: 2021-09-13 | Stop reason: HOSPADM

## 2021-09-11 RX ORDER — NALBUPHINE HCL 10 MG/ML
5 AMPUL (ML) INJECTION EVERY 4 HOURS PRN
Status: DISCONTINUED | OUTPATIENT
Start: 2021-09-11 | End: 2021-09-11

## 2021-09-11 RX ORDER — FERROUS SULFATE 325(65) MG
325 TABLET ORAL 2 TIMES DAILY WITH MEALS
Status: DISCONTINUED | OUTPATIENT
Start: 2021-09-12 | End: 2021-09-13 | Stop reason: HOSPADM

## 2021-09-11 RX ORDER — MODIFIED LANOLIN
OINTMENT (GRAM) TOPICAL PRN
Status: DISCONTINUED | OUTPATIENT
Start: 2021-09-11 | End: 2021-09-13 | Stop reason: HOSPADM

## 2021-09-11 RX ORDER — VALACYCLOVIR HYDROCHLORIDE 500 MG/1
500 TABLET, FILM COATED ORAL DAILY
Status: ON HOLD | COMMUNITY
End: 2021-09-13 | Stop reason: HOSPADM

## 2021-09-11 RX ORDER — TERBUTALINE SULFATE 1 MG/ML
INJECTION, SOLUTION SUBCUTANEOUS
Status: DISCONTINUED
Start: 2021-09-11 | End: 2021-09-11

## 2021-09-11 RX ORDER — SODIUM CHLORIDE, SODIUM LACTATE, POTASSIUM CHLORIDE, AND CALCIUM CHLORIDE .6; .31; .03; .02 G/100ML; G/100ML; G/100ML; G/100ML
1000 INJECTION, SOLUTION INTRAVENOUS PRN
Status: DISCONTINUED | OUTPATIENT
Start: 2021-09-11 | End: 2021-09-11

## 2021-09-11 RX ORDER — SODIUM CHLORIDE 0.9 % (FLUSH) 0.9 %
5-40 SYRINGE (ML) INJECTION EVERY 12 HOURS SCHEDULED
Status: DISCONTINUED | OUTPATIENT
Start: 2021-09-11 | End: 2021-09-13 | Stop reason: HOSPADM

## 2021-09-11 RX ADMIN — FENTANYL CITRATE 100 MCG: 50 INJECTION, SOLUTION INTRAMUSCULAR; INTRAVENOUS at 16:15

## 2021-09-11 RX ADMIN — Medication 15 ML/HR: at 15:25

## 2021-09-11 RX ADMIN — DOCUSATE SODIUM 100 MG: 100 CAPSULE ORAL at 22:09

## 2021-09-11 RX ADMIN — SODIUM CHLORIDE, POTASSIUM CHLORIDE, SODIUM LACTATE AND CALCIUM CHLORIDE: 600; 310; 30; 20 INJECTION, SOLUTION INTRAVENOUS at 14:15

## 2021-09-11 RX ADMIN — BUPIVACAINE HYDROCHLORIDE 8 ML: 2.5 INJECTION, SOLUTION EPIDURAL; INFILTRATION; INTRACAUDAL; PERINEURAL at 16:16

## 2021-09-11 RX ADMIN — Medication 166.7 ML: at 20:15

## 2021-09-11 RX ADMIN — HYDROCODONE BITARTRATE AND ACETAMINOPHEN 1 TABLET: 5; 325 TABLET ORAL at 23:40

## 2021-09-11 RX ADMIN — IBUPROFEN 800 MG: 800 TABLET, FILM COATED ORAL at 22:09

## 2021-09-11 ASSESSMENT — PAIN DESCRIPTION - PAIN TYPE: TYPE: ACUTE PAIN

## 2021-09-11 ASSESSMENT — PAIN DESCRIPTION - PROGRESSION: CLINICAL_PROGRESSION: GRADUALLY WORSENING

## 2021-09-11 ASSESSMENT — PAIN - FUNCTIONAL ASSESSMENT: PAIN_FUNCTIONAL_ASSESSMENT: ACTIVITIES ARE NOT PREVENTED

## 2021-09-11 ASSESSMENT — PAIN DESCRIPTION - DESCRIPTORS: DESCRIPTORS: CRAMPING;TENDER

## 2021-09-11 ASSESSMENT — ENCOUNTER SYMPTOMS: SHORTNESS OF BREATH: 0

## 2021-09-11 ASSESSMENT — PAIN DESCRIPTION - ONSET: ONSET: GRADUAL

## 2021-09-11 ASSESSMENT — PAIN SCALES - GENERAL
PAINLEVEL_OUTOF10: 2
PAINLEVEL_OUTOF10: 6

## 2021-09-11 ASSESSMENT — PAIN DESCRIPTION - LOCATION: LOCATION: PERINEUM;ABDOMEN

## 2021-09-11 ASSESSMENT — PAIN DESCRIPTION - FREQUENCY: FREQUENCY: INTERMITTENT

## 2021-09-11 ASSESSMENT — PAIN DESCRIPTION - ORIENTATION: ORIENTATION: LOWER;MID

## 2021-09-11 NOTE — PROGRESS NOTES
Patient actively pushing. RN and physician remains in continuous attendance at the bedside. Assessment & evaluation of fetal heart rate, ongoing via continuous EFM. '

## 2021-09-11 NOTE — ANESTHESIA PRE PROCEDURE
Department of Anesthesiology  Preprocedure Note       Name:  Mare Hilliard   Age:  31 y.o.  :  1995                                          MRN:  [id number]         Date:  2021      Surgeon: * No surgeons listed *    Procedure: * No procedures listed *    Medications prior to admission:   Prior to Admission medications    Medication Sig Start Date End Date Taking? Authorizing Provider   valACYclovir (VALTREX) 500 MG tablet Take 500 mg by mouth daily   Yes Historical Provider, MD   Prenatal MV-Min-Fe Fum-FA-DHA (PRENATAL 1 PO) Take by mouth   Yes Historical Provider, MD       Current medications:    No current facility-administered medications for this encounter.        Allergies:  No Known Allergies    Problem List:    Patient Active Problem List   Diagnosis Code    Depression F32.9    Benign brain tumor ([facility] [address] D33.2    Anxiety F41.9    Encounter for  screening for malformation using ultrasound Z36.3    Medication exposure during first trimester of pregnancy O09.891    Suspected shortening of cervix not found Z03.75    History of herpes genitalis Z86.19    Marijuana use F12.90    37 weeks gestation of pregnancy Z3A.37    Primiparity, third trimester Z34.03    38 weeks gestation of pregnancy Z3A.38       Past Medical History:        Diagnosis Date    Anxiety     Benign brain tumor ([facility] [address]     gets annual MRI    Depression     Herpes        Past Surgical History:        Procedure Laterality Date    ADENOIDECTOMY      TONSILLECTOMY         Social History:    Social History     Tobacco Use    Smoking status: Former Smoker     Packs/day: 0.25     Years: 8.00     Pack years: 2.00     Types: Cigarettes    Smokeless tobacco: Never Used    Tobacco comment: quit when she found out she was pregnant   Substance Use Topics    Alcohol use: Not Currently     Comment: occasionally                                Counseling given: Not Answered  Comment: quit when she found out she was pregnant      Vital Signs (Current):   Vitals:    09/11/21 1334 09/11/21 1345   BP:  139/88   Pulse:  85   Weight: 160 lb (72.6 kg)    Height: 5' (1.524 m)                                               BP Readings from Last 3 Encounters:   09/11/21 139/88   09/02/21 130/77   05/24/21 123/76       NPO Status: Time of last liquid consumption: 1200                        Time of last solid consumption: 1200                        Date of last liquid consumption: 09/11/21                        Date of last solid food consumption: 09/11/21    BMI:   Wt Readings from Last 3 Encounters:   09/11/21 160 lb (72.6 kg)   05/24/21 130 lb 9.6 oz (59.2 kg)   03/20/21 105 lb (47.6 kg)     Body mass index is 31.25 kg/m². CBC:   Lab Results   Component Value Date    WBC 14.2 09/02/2021    RBC 3.73 09/02/2021    HGB 11.7 09/02/2021    HCT 34.9 09/02/2021    MCV 93.6 09/02/2021    RDW 13.5 09/02/2021     09/02/2021       CMP:   Lab Results   Component Value Date     09/02/2021    K 4.2 09/02/2021     09/02/2021    CO2 20 09/02/2021    BUN 7 09/02/2021    CREATININE 0.6 09/02/2021    GFRAA >60 09/02/2021    LABGLOM >60 09/02/2021    GLUCOSE 102 09/02/2021    GLUCOSE 86 03/01/2011    PROT 7.3 09/02/2021    CALCIUM 9.5 09/02/2021    BILITOT <0.2 09/02/2021    ALKPHOS 204 09/02/2021    AST 17 09/02/2021    ALT 16 09/02/2021       POC Tests: No results for input(s): POCGLU, POCNA, POCK, POCCL, POCBUN, POCHEMO, POCHCT in the last 72 hours.     Coags: No results found for: PROTIME, INR, APTT    HCG (If Applicable):   Lab Results   Component Value Date    PREGTESTUR positive 11/28/2017    PREGSERUM NEGATIVE 03/01/2011        ABGs: No results found for: PHART, PO2ART, YFM7UDK, OWE4YWH, BEART, G8ZOKVUR     Type & Screen (If Applicable):  No results found for: LABABO, LABRH    Drug/Infectious Status (If Applicable):  No results found for: HIV, HEPCAB    COVID-19 Screening (If Applicable): No results found for: COVID19        Anesthesia Evaluation  Patient summary reviewed and Nursing notes reviewed no history of anesthetic complications:   Airway: Mallampati: II  TM distance: >3 FB   Neck ROM: full  Mouth opening: > = 3 FB Dental:          Pulmonary: breath sounds clear to auscultation      (-) shortness of breath                          ROS comment: Former 2 pack year smoker, quit with pregnancy   Cardiovascular:Negative CV ROS            Rhythm: regular  Rate: normal                    Neuro/Psych:   (+) headaches:, psychiatric history: stable without treatmentdepression/anxiety              ROS comment: Brain lesion, chronic n/v, migraines. GI/Hepatic/Renal: Neg GI/Hepatic/Renal ROS            Endo/Other: Negative Endo/Other ROS                    Abdominal:             Vascular: Other Findings:             Anesthesia Plan      general, spinal and epidural     ASA 2             Anesthetic plan and risks discussed with patient. Use of blood products discussed with patient whom consented to blood products.                    EVELIN Douglass - CRNA   9/11/2021

## 2021-09-11 NOTE — PROGRESS NOTES
Patient actively pushing. RN and physician remains in continuous attendance at the bedside. Assessment & evaluation of fetal heart rate, ongoing via continuous EFM.     IV fluid bolus infusing, pushing every other ctx q 2-6, o2 by mask

## 2021-09-11 NOTE — PROGRESS NOTES
I was asked to perform artificial rupture of membranes on this laboring patient. Blood pressure 139/88, pulse 85, height 5' (1.524 m), weight 160 lb (72.6 kg), last menstrual period 12/14/2020. Fetal heart tones: Reactive before and after rupture of membranes  Cervix: 5 cm, 90%, vertex at S- 1.   Thick meconium stained amniotic fluid noted  [place]: Contractions every 3 4 minutes

## 2021-09-11 NOTE — PROGRESS NOTES
Dr Ashok Sierra notified pt 7 cm at this time. Pt was ruptured at [facility] for meconium stained fluid.  nicu to attend delivery

## 2021-09-11 NOTE — PROGRESS NOTES
Pt presents to antepartum  38weeks 5 days, complaining of decrease fetal movement, and lower back pain. Denies any lof,vb. Placed on monitor.  Vitals taken

## 2021-09-11 NOTE — PROGRESS NOTES
Patient actively pushing. RN remains in continuous attendance at the bedside. Assessment & evaluation of fetal heart rate, ongoing via continuous EFM.     Physician in house

## 2021-09-11 NOTE — ANESTHESIA PROCEDURE NOTES
Epidural Block    Patient location during procedure: OB  Start time: 9/11/2021 3:20 PM  End time: 9/11/2021 3:35 PM  Reason for block: labor epidural  Staffing  Performed: other anesthesia staff   Anesthesiologist: Ethan Lopez MD  Resident/CRNA: Bar Moss APRN - CRNA  Other anesthesia staff: Bahman Thomas RN  Preanesthetic Checklist  Completed: patient identified, IV checked, site marked, risks and benefits discussed, surgical consent, monitors and equipment checked, pre-op evaluation, timeout performed, anesthesia consent given, oxygen available and patient being monitored  Epidural  Patient position: sitting  Prep: ChloraPrep  Patient monitoring: cardiac monitor, continuous pulse ox and frequent blood pressure checks  Approach: midline  Location: lumbar (1-5)  Injection technique: FERCHO air  Provider prep: mask and sterile gloves  Needle  Needle type: Tuohy   Needle gauge: 18 G  Needle length: 2.5 in  Needle insertion depth: 5 cm  Catheter type: end hole  Catheter size: 20 G.   Catheter at skin depth: 11 cm  Test dose: negative  Assessment  Hemodynamics: stable  Attempts: 1

## 2021-09-11 NOTE — H&P
Subjective:      Rick Weaver is an 31 y.o. female at 45 and 5/7 weeks gestation presenting with decreased fetal movements. She is also complaining of contractions every 3 minutes lasting 30 seconds. Patient was checked in the office yesterday and she was 4 cm dilated. Denies any bleeding or fluid leak. Other associated symptoms include low back pain. Fetal Movement: normal.  Past Medical History:   Diagnosis Date    Anxiety     Benign brain tumor [facility])     gets annual MRI    Depression     Herpes        Review of Systems  Pertinent items are noted in HPI. Objective:      LMP 12/14/2020   Last menstrual period 12/14/2020. General:   alert, appears stated age and cooperative   Cervix:   5 cm dilated 80% effaced vertex at S- 2 with bulging membranes.    FHT:   140 and reactive     Lab Review    CBC:   Lab Results   Component Value Date    WBC 14.2 09/02/2021    RBC 3.73 09/02/2021    HGB 11.7 09/02/2021    HCT 34.9 09/02/2021    MCV 93.6 09/02/2021    MCH 31.4 09/02/2021    MCHC 33.5 09/02/2021    RDW 13.5 09/02/2021     09/02/2021    MPV 9.7 09/02/2021     CMP:    Lab Results   Component Value Date     09/02/2021    K 4.2 09/02/2021     09/02/2021    CO2 20 09/02/2021    BUN 7 09/02/2021    CREATININE 0.6 09/02/2021    GFRAA >60 09/02/2021    LABGLOM >60 09/02/2021    GLUCOSE 102 09/02/2021    GLUCOSE 86 03/01/2011    PROT 7.3 09/02/2021    LABALBU 3.9 09/02/2021    LABALBU 5.2 03/01/2011    CALCIUM 9.5 09/02/2021    BILITOT <0.2 09/02/2021    ALKPHOS 204 09/02/2021    AST 17 09/02/2021    ALT 16 09/02/2021     U/A:    Lab Results   Component Value Date    COLORU Yellow 09/02/2021    PHUR 7.0 09/02/2021    WBCUA NONE 09/02/2021    WBCUA >20 03/17/2011    RBCUA NONE 09/02/2021    RBCUA 5-10 03/17/2011    BACTERIA RARE 09/02/2021    CLARITYU Clear 09/02/2021    SPECGRAV 1.010 09/02/2021    LEUKOCYTESUR Negative 09/02/2021    UROBILINOGEN 0.2 09/02/2021    BILIRUBINUR Negative 09/02/2021    BILIRUBINUR NEGATIVE 03/17/2011    BLOODU Negative 09/02/2021    GLUCOSEU Negative 09/02/2021    GLUCOSEU NEGATIVE 03/17/2011    AMORPHOUS MANY 03/20/2021          Assessment:     Early labor    Negative GBS     Plan:      Monitored for contractions - findings: contractions every 3 minutes and reactive strip. Orders: Expectant management, epidural if desired. .     Harman Gibbs MD,MD,9/11/2021 1:04 PM

## 2021-09-12 PROCEDURE — 1220000000 HC SEMI PRIVATE OB R&B

## 2021-09-12 PROCEDURE — 2580000003 HC RX 258: Performed by: OBSTETRICS & GYNECOLOGY

## 2021-09-12 PROCEDURE — 6370000000 HC RX 637 (ALT 250 FOR IP): Performed by: OBSTETRICS & GYNECOLOGY

## 2021-09-12 RX ADMIN — DOCUSATE SODIUM 100 MG: 100 CAPSULE ORAL at 08:50

## 2021-09-12 RX ADMIN — FERROUS SULFATE TAB 325 MG (65 MG ELEMENTAL FE) 325 MG: 325 (65 FE) TAB at 08:51

## 2021-09-12 RX ADMIN — HYDROCODONE BITARTRATE AND ACETAMINOPHEN 2 TABLET: 5; 325 TABLET ORAL at 12:02

## 2021-09-12 RX ADMIN — HYDROCODONE BITARTRATE AND ACETAMINOPHEN 2 TABLET: 5; 325 TABLET ORAL at 03:33

## 2021-09-12 RX ADMIN — IBUPROFEN 800 MG: 800 TABLET, FILM COATED ORAL at 16:52

## 2021-09-12 RX ADMIN — DOCUSATE SODIUM 100 MG: 100 CAPSULE ORAL at 21:14

## 2021-09-12 RX ADMIN — HYDROCODONE BITARTRATE AND ACETAMINOPHEN 1 TABLET: 5; 325 TABLET ORAL at 16:20

## 2021-09-12 RX ADMIN — IBUPROFEN 800 MG: 800 TABLET, FILM COATED ORAL at 08:50

## 2021-09-12 RX ADMIN — FERROUS SULFATE TAB 325 MG (65 MG ELEMENTAL FE) 325 MG: 325 (65 FE) TAB at 18:15

## 2021-09-12 RX ADMIN — SODIUM CHLORIDE, PRESERVATIVE FREE 5 ML: 5 INJECTION INTRAVENOUS at 09:00

## 2021-09-12 RX ADMIN — HYDROCODONE BITARTRATE AND ACETAMINOPHEN 1 TABLET: 5; 325 TABLET ORAL at 21:14

## 2021-09-12 ASSESSMENT — PAIN SCALES - GENERAL
PAINLEVEL_OUTOF10: 3
PAINLEVEL_OUTOF10: 3
PAINLEVEL_OUTOF10: 5
PAINLEVEL_OUTOF10: 7
PAINLEVEL_OUTOF10: 7
PAINLEVEL_OUTOF10: 3
PAINLEVEL_OUTOF10: 6

## 2021-09-12 NOTE — PROGRESS NOTES
[facility] [place] [facility] Screen attempted, in room. Spoke to mom regarding need to recheck on 2021.   Yolanda De Santiago

## 2021-09-12 NOTE — OP NOTE
Vaginal Delivery    Infant Wt:  pending    APGARS:     1 minute: 9  5 minute: 9    Time of Delivery:2002  Delayed cord clamping  Fetal Position: occiput anterior  scn at delivery secondary to meconium. Spontaneous respirations. Baby suctioned with bulb. Placenta delivered spontaneously intact at 1. Placenta sent to pathology: Yes     Estimated blood loss:  300 cc    Uterus firm, pitocin running. Cervix and rectum intact. Episiotomy: Yes  Repaired with 3.0 Vicryl  Laceration: No    Cord blood and gases done. Sponge count correct. No complications.

## 2021-09-12 NOTE — PROGRESS NOTES
Subjective:    Patient without complaints. Normal lochia.       Objective:  /68   Pulse 85   Temp 97.8 °F (36.6 °C) (Oral)   Resp 18   Ht 5' (1.524 m)   Wt 160 lb (72.6 kg)   LMP 12/14/2020   SpO2 96%   Breastfeeding Unknown   BMI 31.25 kg/m²   Abdomen:  Uterus firm, non-tender  Extremities:  No calf pain    Assessment:  Post-partum day # 1   Vaginal delivery    Plan:Continue current care

## 2021-09-12 NOTE — PROGRESS NOTES
Pt up to bathroom and voided. [facility] care preformed with ice on perineum. Pt tolerated well. VSS throughout recovery.  Pt waiting to eat dinner and will be moving to MB room 305

## 2021-09-12 NOTE — FLOWSHEET NOTE
Pt admitted to 305 from L&D. Oriented to room, surroundings and handouts. Pt unsure if she wants tDap vaccine. PPD handout given and explained. Instructed to call RN with any needs.

## 2021-09-12 NOTE — PROGRESS NOTES
Patient actively pushing. RN remains in continuous attendance at the bedside. Assessment & evaluation of fetal heart rate, ongoing via continuous EFM.  of viable infant boy at . NICU in attendance due to thick meconium stained fluid. APGARS 9/9. 30 seconds delayed cord clamping preformed. VSS for infant and mother. Infant moved skin to skin with mother.

## 2021-09-12 NOTE — PROGRESS NOTES
Patient actively pushing. RN remains in continuous attendance at the bedside. Assessment & evaluation of fetal heart rate, ongoing via continuous EFM. Dr. Shun Castillo at nurses station.

## 2021-09-13 VITALS
TEMPERATURE: 97.8 F | OXYGEN SATURATION: 98 % | BODY MASS INDEX: 31.41 KG/M2 | SYSTOLIC BLOOD PRESSURE: 125 MMHG | DIASTOLIC BLOOD PRESSURE: 76 MMHG | RESPIRATION RATE: 16 BRPM | HEIGHT: 60 IN | WEIGHT: 160 LBS | HEART RATE: 76 BPM

## 2021-09-13 PROCEDURE — 6370000000 HC RX 637 (ALT 250 FOR IP): Performed by: OBSTETRICS & GYNECOLOGY

## 2021-09-13 PROCEDURE — 90471 IMMUNIZATION ADMIN: CPT | Performed by: OBSTETRICS & GYNECOLOGY

## 2021-09-13 PROCEDURE — 6360000002 HC RX W HCPCS: Performed by: OBSTETRICS & GYNECOLOGY

## 2021-09-13 PROCEDURE — 90715 TDAP VACCINE 7 YRS/> IM: CPT | Performed by: OBSTETRICS & GYNECOLOGY

## 2021-09-13 RX ORDER — IBUPROFEN 800 MG/1
800 TABLET ORAL EVERY 8 HOURS PRN
Qty: 24 TABLET | Refills: 0 | Status: SHIPPED | OUTPATIENT
Start: 2021-09-13

## 2021-09-13 RX ORDER — FERROUS SULFATE 325(65) MG
325 TABLET ORAL
Qty: 30 TABLET | Refills: 0 | Status: SHIPPED | OUTPATIENT
Start: 2021-09-13

## 2021-09-13 RX ADMIN — DOCUSATE SODIUM 100 MG: 100 CAPSULE ORAL at 09:52

## 2021-09-13 RX ADMIN — FERROUS SULFATE TAB 325 MG (65 MG ELEMENTAL FE) 325 MG: 325 (65 FE) TAB at 09:52

## 2021-09-13 RX ADMIN — TETANUS TOXOID, REDUCED DIPHTHERIA TOXOID AND ACELLULAR PERTUSSIS VACCINE, ADSORBED 0.5 ML: 5; 2.5; 8; 8; 2.5 SUSPENSION INTRAMUSCULAR at 15:13

## 2021-09-13 RX ADMIN — Medication: at 09:52

## 2021-09-13 RX ADMIN — IBUPROFEN 800 MG: 800 TABLET, FILM COATED ORAL at 00:59

## 2021-09-13 RX ADMIN — HYDROCODONE BITARTRATE AND ACETAMINOPHEN 2 TABLET: 5; 325 TABLET ORAL at 09:52

## 2021-09-13 RX ADMIN — HYDROCODONE BITARTRATE AND ACETAMINOPHEN 2 TABLET: 5; 325 TABLET ORAL at 02:57

## 2021-09-13 RX ADMIN — HYDROCODONE BITARTRATE AND ACETAMINOPHEN 2 TABLET: 5; 325 TABLET ORAL at 14:26

## 2021-09-13 ASSESSMENT — PAIN SCALES - GENERAL
PAINLEVEL_OUTOF10: 0
PAINLEVEL_OUTOF10: 3
PAINLEVEL_OUTOF10: 7
PAINLEVEL_OUTOF10: 7

## 2021-09-13 ASSESSMENT — PAIN DESCRIPTION - PROGRESSION: CLINICAL_PROGRESSION: RESOLVED

## 2021-09-13 ASSESSMENT — PAIN DESCRIPTION - RADICULAR PAIN: RADICULAR_PAIN: ABSENT

## 2021-09-13 ASSESSMENT — PAIN DESCRIPTION - DESCRIPTORS: DESCRIPTORS: CRAMPING

## 2021-09-13 NOTE — PROGRESS NOTES
Post-Partum Note    PPD#2     S:  Patient without complaints. Bottle feeding. Lochia normal.  Ambulating. O: /75   Pulse 76   Temp 98.6 °F (37 °C) (Oral)   Resp 18   Ht 5' (1.524 m)   Wt 160 lb (72.6 kg)   LMP 2020   SpO2 98%   Breastfeeding Unknown   BMI 31.25 kg/m²               ABD:    Uterus firm, non-tender. EXT:     No Concetta's. LABS:     Hemoglobin/Hematocrit:    Lab Results   Component Value Date    HGB 8.0 2021    HCT 23.9 2021       IMP:  1. PPD#2, status-post vaginal delivery            2. Patient Active Problem List   Diagnosis    Depression    Benign brain tumor ([facility] [address]    Anxiety    Encounter for  screening for malformation using ultrasound    Medication exposure during first trimester of pregnancy    Suspected shortening of cervix not found    History of herpes genitalis    Marijuana use    37 weeks gestation of pregnancy    Primiparity, third trimester    38 weeks gestation of pregnancy              3.  Anemia    Plan: 1. Routine post-partum care           2. Discharge to home           3. Follow-up in office as directed           4. Iron supplementation           5.   Instructions given to watch for any signs of emotional lability/sadness

## 2021-09-13 NOTE — PROGRESS NOTES
CLINICAL PHARMACY NOTE: MEDS TO BEDS    Total # of Prescriptions Filled: 2   The following medications were delivered to the patient:  · Ferrous sulfate 325  · ibu 800    Additional Documentation:

## 2021-09-13 NOTE — CARE COORDINATION
SW Discharge Planning   SW received consult for \" + MJ UDS on baby; unable to obtain drug screen on mother while in labor. Cord stat sent\"    ASHANTI met with Nigel Thao ( [phone]) first time mother to baby roz Prasad ( 9/11/12) and introduced self and role. Nnamdi Sams reported that she resides at the address listed in the chart with father of the baby, Mary Escalona ( 11/17/93). Nnamdi Sams stated that she is currently unemployed and will be adding baby to [employer]. Per Nnamdi Sams, prenatal care was with Dr. Preeti Barakat and pediatric care will be with Dr. Nessa Silver. Nnamdi Smas Reported that she has all needed items including a car seat and pack and play. We discussed safe sleep practices. Aman Held declined a [facility] and WIC referral. Nnamdi Sams  denied any past or current history of children services involvement, legal issues, substance abuse aside from, domestic violence or mental health diagnosis. We discussed awareness of Post Partum Depression and encouraged contact with her OB if any problems arise. SW address baby's [positive UDS for THC and Nnamdi Sams reported she smoked THC up to about 2 weeks ago. Nnamdi Sams expressed understanding for a [employer] ( [phone]) referral    Nnamdi Sams was polite and pleasant during conversation.  Baby was not in the room to assess bonding     SW completed [employer] ( [phone]) referral to Emily Pardo in intake     PLAN    Baby can NOT be discharged home until [facility] ( [phone]) provides disposition  SW to continue communication with nursing staff and [facility] ( [phone])    Electronically signed by ROSANNA Conway on 9/13/2021 at 10:40 AM

## 2021-09-13 NOTE — DISCHARGE SUMMARY
Obstetric Discharge Summary    Patient Name:  Rufina Dominguez Record Number:  [id number]    Attending:  Aram De MD    Date of Admission:  2021    Date of Discharge:        Admitting Diagnosis     OB History        2    Para   1    Term   1            AB   1    Living   1       SAB   1    TAB        Ectopic        Molar        Multiple   0    Live Births   1                Reasons for Admission on 2021 11:58 AM  38 weeks gestation of pregnancy [Z3A.38]       Intrapartum Procedures   Spontaneous Vaginal Delivery        Postpartum/Operative Complications   None    [place] Data  Information for the patient's :  Boni Grewal [[id number]]   male   Birth Weight: 6 lb 10.5 oz (3.02 kg)     Discharge With Mother  Complications: No    Discharge Diagnosis       Discharge Condition:    Stable    Discharge Meds:       Medication List      START taking these medications    ferrous sulfate 325 (65 Fe) MG tablet  Commonly known as: IRON 325  Take 1 tablet by mouth daily (with breakfast)     ibuprofen 800 MG tablet  Commonly known as: ADVIL;MOTRIN  Take 1 tablet by mouth every 8 hours as needed for Pain        CONTINUE taking these medications    PRENATAL 1 PO        STOP taking these medications    valACYclovir 500 MG tablet  Commonly known as: VALTREX           Where to Get Your Medications      You can get these medications from any pharmacy    Bring a paper prescription for each of these medications  · ferrous sulfate 325 (65 Fe) MG tablet  · ibuprofen 800 MG tablet         Discharge Information  Current Discharge Medication List      START taking these medications    Details   ferrous sulfate (IRON 325) 325 (65 Fe) MG tablet Take 1 tablet by mouth daily (with breakfast)  Qty: 30 tablet, Refills: 0      ibuprofen (ADVIL;MOTRIN) 800 MG tablet Take 1 tablet by mouth every 8 hours as needed for Pain  Qty: 24 tablet, Refills: 0         CONTINUE these medications which have NOT CHANGED    Details   Prenatal MV-Min-Fe Fum-FA-DHA (PRENATAL 1 PO) Take by mouth         STOP taking these medications       valACYclovir (VALTREX) 500 MG tablet Comments:   Reason for Stopping:               No discharge procedures on file.     Discharge to: Home  Follow up in 6-8 weeks        Comments

## 2021-09-20 NOTE — CARE COORDINATION
SW Discharge Planning     SW noted baby's cordstat to be positive for THC.  SW called [facility] ( [phone]) and provided information to , [facility]     Electronically signed by ROSANNA Bello on 9/20/2021 at 3:07 PM

## 2022-09-26 ENCOUNTER — OFFICE VISIT (OUTPATIENT)
Dept: FAMILY MEDICINE CLINIC | Age: 27
End: 2022-09-26
Payer: COMMERCIAL

## 2022-09-26 VITALS
OXYGEN SATURATION: 98 % | RESPIRATION RATE: 18 BRPM | TEMPERATURE: 97.7 F | DIASTOLIC BLOOD PRESSURE: 74 MMHG | HEIGHT: 61 IN | SYSTOLIC BLOOD PRESSURE: 112 MMHG | WEIGHT: 108.4 LBS | HEART RATE: 83 BPM | BODY MASS INDEX: 20.47 KG/M2

## 2022-09-26 DIAGNOSIS — S80.869A INSECT BITE OF LOWER LEG, UNSPECIFIED LATERALITY, INITIAL ENCOUNTER: Primary | ICD-10-CM
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
## 2022-09-26 PROCEDURE — G8427 DOCREV CUR MEDS BY ELIG CLIN: HCPCS | Performed by: STUDENT IN AN ORGANIZED HEALTH CARE EDUCATION/TRAINING PROGRAM

## 2022-09-26 PROCEDURE — 99213 OFFICE O/P EST LOW 20 MIN: CPT | Performed by: STUDENT IN AN ORGANIZED HEALTH CARE EDUCATION/TRAINING PROGRAM

## 2022-09-26 PROCEDURE — G8420 CALC BMI NORM PARAMETERS: HCPCS | Performed by: STUDENT IN AN ORGANIZED HEALTH CARE EDUCATION/TRAINING PROGRAM

## 2022-09-26 PROCEDURE — 1036F TOBACCO NON-USER: CPT | Performed by: STUDENT IN AN ORGANIZED HEALTH CARE EDUCATION/TRAINING PROGRAM

## 2022-09-26 RX ORDER — METHYLPREDNISOLONE 4 MG/1
TABLET ORAL
Qty: 1 KIT | Refills: 0 | Status: SHIPPED | OUTPATIENT
Start: 2022-09-26 | End: 2022-10-02

## 2022-09-26 ASSESSMENT — ENCOUNTER SYMPTOMS
ABDOMINAL PAIN: 0
SHORTNESS OF BREATH: 0
COUGH: 0
VOMITING: 0
RHINORRHEA: 0
NAUSEA: 0

## 2022-09-26 NOTE — PROGRESS NOTES
Stewart Richards (:  1995) is a 31 y.o. female,here for evaluation of the following chief complaint(s): Allergic Reaction (Patient states that she had gotten bit by fleas having a reaction. . bilateral ankles/feet are all red/ itchy/ some tenderness/ edema/blisters )       ASSESSMENT/PLAN:  1. Insect bite of lower leg, unspecified laterality, initial encounter  -     methylPREDNISolone (MEDROL DOSEPACK) 4 MG tablet; Take by mouth., Disp-1 kit, R-0Normal  -     fluocinonide (LIDEX) 0.05 % cream; Apply topically 2 times daily. , Disp-15 g, R-0, Normal  Looks like she is having a reaction to bites. Steroids. OK to use benadryl, at night to help with itch. Avised to return if noticing any lymphadenopathy. Discussed return and ER precautions. Patient and or parent verbalized understanding    Return if symptoms worsen or fail to improve. Subjective   SUBJECTIVE/OBJECTIVE:  Flea bites/reaction  -on her ankles  -afterwards significant swelling/redness/itching  -not in any other locations  -no spreading  -works as a , had a known flee issue which was resolved  -very itchy  -some blistering          Review of Systems   Constitutional:  Negative for chills and fever. HENT:  Negative for congestion and rhinorrhea. Respiratory:  Negative for cough and shortness of breath. Cardiovascular:  Negative for chest pain and leg swelling. Gastrointestinal:  Negative for abdominal pain, nausea and vomiting. Genitourinary:  Negative for dysuria and hematuria. Musculoskeletal:  Negative for arthralgias and myalgias. Skin:  Positive for rash. Negative for wound. Neurological:  Negative for dizziness and light-headedness. Hematological:  Negative for adenopathy. Objective   Physical Exam  Constitutional:       General: She is not in acute distress. HENT:      Head: Normocephalic and atraumatic. Cardiovascular:      Rate and Rhythm: Normal rate.    Pulmonary:      Effort: Pulmonary effort is normal. No respiratory distress. Skin:     Capillary Refill: Capillary refill takes less than 2 seconds. Comments: Distribution of rash above. Very small vesicular and papular lesions that an erythematous confluence on the ankle/lower foot/upper tibia region bilaterally. Neurological:      Mental Status: She is alert and oriented to person, place, and time. An electronic signature was used to authenticate this note.     --Cici Muñiz MD

## 2023-02-22 ENCOUNTER — HOSPITAL ENCOUNTER (EMERGENCY)
Age: 28
Discharge: HOME | End: 2023-02-22
Attending: EMERGENCY MEDICINE
Payer: COMMERCIAL

## 2023-02-22 VITALS
TEMPERATURE: 98.7 F | DIASTOLIC BLOOD PRESSURE: 78 MMHG | HEART RATE: 92 BPM | WEIGHT: 110 LBS | OXYGEN SATURATION: 100 % | BODY MASS INDEX: 21.6 KG/M2 | RESPIRATION RATE: 18 BRPM | SYSTOLIC BLOOD PRESSURE: 140 MMHG | HEIGHT: 60 IN

## 2023-02-22 DIAGNOSIS — Z34.90 INTRAUTERINE PREGNANCY: ICD-10-CM

## 2023-02-22 DIAGNOSIS — R11.2 NAUSEA AND VOMITING, UNSPECIFIED VOMITING TYPE: ICD-10-CM

## 2023-02-22 DIAGNOSIS — U07.1 COVID-19: Primary | ICD-10-CM

## 2023-02-22 LAB
ALBUMIN SERPL-MCNC: 3.9 G/DL (ref 3.5–5.2)
ALP BLD-CCNC: 79 U/L (ref 35–104)
ALT SERPL-CCNC: 15 U/L (ref 0–32)
ANION GAP SERPL CALCULATED.3IONS-SCNC: 13 MMOL/L (ref 7–16)
AST SERPL-CCNC: 18 U/L (ref 0–31)
BACTERIA: ABNORMAL /HPF
BASOPHILS ABSOLUTE: 0.02 E9/L (ref 0–0.2)
BASOPHILS RELATIVE PERCENT: 0.3 % (ref 0–2)
BILIRUB SERPL-MCNC: 0.2 MG/DL (ref 0–1.2)
BILIRUBIN DIRECT: <0.2 MG/DL (ref 0–0.3)
BILIRUBIN URINE: NEGATIVE
BILIRUBIN, INDIRECT: NORMAL MG/DL (ref 0–1)
BLOOD, URINE: NEGATIVE
BUN BLDV-MCNC: 5 MG/DL (ref 6–20)
CALCIUM SERPL-MCNC: 8.9 MG/DL (ref 8.6–10.2)
CHLORIDE BLD-SCNC: 99 MMOL/L (ref 98–107)
CLARITY: CLEAR
CO2: 25 MMOL/L (ref 22–29)
COLOR: YELLOW
CREAT SERPL-MCNC: 0.5 MG/DL (ref 0.5–1)
EOSINOPHILS ABSOLUTE: 0 E9/L (ref 0.05–0.5)
EOSINOPHILS RELATIVE PERCENT: 0 % (ref 0–6)
EPITHELIAL CELLS, UA: ABNORMAL /HPF
GFR SERPL CREATININE-BSD FRML MDRD: >60 ML/MIN/1.73
GLUCOSE BLD-MCNC: 83 MG/DL (ref 74–99)
GLUCOSE URINE: NEGATIVE MG/DL
HCT VFR BLD CALC: 36.8 % (ref 34–48)
HEMOGLOBIN: 12.6 G/DL (ref 11.5–15.5)
IMMATURE GRANULOCYTES #: 0.03 E9/L
IMMATURE GRANULOCYTES %: 0.4 % (ref 0–5)
KETONES, URINE: >=80 MG/DL
LEUKOCYTE ESTERASE, URINE: NEGATIVE
LYMPHOCYTES ABSOLUTE: 0.77 E9/L (ref 1.5–4)
LYMPHOCYTES RELATIVE PERCENT: 9.7 % (ref 20–42)
MCH RBC QN AUTO: 31.3 PG (ref 26–35)
MCHC RBC AUTO-ENTMCNC: 34.2 % (ref 32–34.5)
MCV RBC AUTO: 91.5 FL (ref 80–99.9)
MONOCYTES ABSOLUTE: 0.59 E9/L (ref 0.1–0.95)
MONOCYTES RELATIVE PERCENT: 7.5 % (ref 2–12)
NEUTROPHILS ABSOLUTE: 6.5 E9/L (ref 1.8–7.3)
NEUTROPHILS RELATIVE PERCENT: 82.1 % (ref 43–80)
NITRITE, URINE: NEGATIVE
PDW BLD-RTO: 12.9 FL (ref 11.5–15)
PH UA: 6 (ref 5–9)
PLATELET # BLD: 251 E9/L (ref 130–450)
PMV BLD AUTO: 9.6 FL (ref 7–12)
POTASSIUM SERPL-SCNC: 3.1 MMOL/L (ref 3.5–5)
PROTEIN UA: 30 MG/DL
RBC # BLD: 4.02 E12/L (ref 3.5–5.5)
RBC UA: ABNORMAL /HPF (ref 0–2)
SARS-COV-2, NAAT: DETECTED
SODIUM BLD-SCNC: 137 MMOL/L (ref 132–146)
SPECIFIC GRAVITY UA: 1.02 (ref 1–1.03)
TOTAL PROTEIN: 6.6 G/DL (ref 6.4–8.3)
UROBILINOGEN, URINE: 0.2 E.U./DL
WBC # BLD: 7.9 E9/L (ref 4.5–11.5)
WBC UA: ABNORMAL /HPF (ref 0–5)

## 2023-02-22 PROCEDURE — 85025 COMPLETE CBC W/AUTO DIFF WBC: CPT

## 2023-02-22 PROCEDURE — 6360000002 HC RX W HCPCS: Performed by: EMERGENCY MEDICINE

## 2023-02-22 PROCEDURE — 87635 SARS-COV-2 COVID-19 AMP PRB: CPT

## 2023-02-22 PROCEDURE — 2580000003 HC RX 258: Performed by: EMERGENCY MEDICINE

## 2023-02-22 PROCEDURE — 81001 URINALYSIS AUTO W/SCOPE: CPT

## 2023-02-22 PROCEDURE — 99284 EMERGENCY DEPT VISIT MOD MDM: CPT

## 2023-02-22 PROCEDURE — 80048 BASIC METABOLIC PNL TOTAL CA: CPT

## 2023-02-22 PROCEDURE — 80076 HEPATIC FUNCTION PANEL: CPT

## 2023-02-22 PROCEDURE — 96374 THER/PROPH/DIAG INJ IV PUSH: CPT

## 2023-02-22 PROCEDURE — 6370000000 HC RX 637 (ALT 250 FOR IP): Performed by: EMERGENCY MEDICINE

## 2023-02-22 RX ORDER — ONDANSETRON 4 MG/1
4 TABLET, FILM COATED ORAL EVERY 8 HOURS PRN
Qty: 4 TABLET | Refills: 0 | Status: SHIPPED | OUTPATIENT
Start: 2023-02-22

## 2023-02-22 RX ORDER — ONDANSETRON 2 MG/ML
4 INJECTION INTRAMUSCULAR; INTRAVENOUS ONCE
Status: COMPLETED | OUTPATIENT
Start: 2023-02-22 | End: 2023-02-22

## 2023-02-22 RX ORDER — ONDANSETRON 4 MG/1
4 TABLET, FILM COATED ORAL EVERY 8 HOURS PRN
Qty: 4 TABLET | Refills: 0 | Status: SHIPPED | OUTPATIENT
Start: 2023-02-22 | End: 2023-02-22 | Stop reason: SDUPTHER

## 2023-02-22 RX ORDER — 0.9 % SODIUM CHLORIDE 0.9 %
1000 INTRAVENOUS SOLUTION INTRAVENOUS ONCE
Status: COMPLETED | OUTPATIENT
Start: 2023-02-22 | End: 2023-02-22

## 2023-02-22 RX ADMIN — POTASSIUM BICARBONATE 20 MEQ: 782 TABLET, EFFERVESCENT ORAL at 14:24

## 2023-02-22 RX ADMIN — SODIUM CHLORIDE 1000 ML: 9 INJECTION, SOLUTION INTRAVENOUS at 12:31

## 2023-02-22 RX ADMIN — ONDANSETRON 4 MG: 2 INJECTION INTRAMUSCULAR; INTRAVENOUS at 12:29

## 2023-02-22 ASSESSMENT — LIFESTYLE VARIABLES
HOW MANY STANDARD DRINKS CONTAINING ALCOHOL DO YOU HAVE ON A TYPICAL DAY: PATIENT DOES NOT DRINK
HOW OFTEN DO YOU HAVE A DRINK CONTAINING ALCOHOL: NEVER

## 2023-02-22 NOTE — ED NOTES
PT given lunch box and was able to eat and hold down some food.       Carie Bullock RN  02/22/23 [id number]

## 2023-02-23 ASSESSMENT — ENCOUNTER SYMPTOMS
SORE THROAT: 0
ABDOMINAL PAIN: 1
BACK PAIN: 0
ABDOMINAL DISTENTION: 0
EYE DISCHARGE: 0
SHORTNESS OF BREATH: 0
NAUSEA: 1
SINUS PRESSURE: 0
WHEEZING: 0
EYE REDNESS: 0
EYE PAIN: 0
DIARRHEA: 0
VOMITING: 1
COUGH: 1
SINUS CONGESTION: 1

## 2023-02-23 NOTE — ED PROVIDER NOTES
79-year-old female presents to the emergency department with low abdominal pain cough congestion nausea vomiting and generalized fatigue. The patient is known to be 15 weeks pregnant reports no vaginal bleeding or discharge she states mild headache but no vision changes or other complaints. States no abdominal cramping. She is states also family numbers have also had similar symptoms are getting over this. She was also concerned she was exposed to possible chemicals at [place] and came in for further evaluation for this    The history is provided by the patient. Cough  Cough characteristics:  Non-productive  Sputum characteristics:  Nondescript  Severity:  Mild  Onset quality:  Gradual  Duration:  2 days  Timing:  Intermittent  Progression:  Waxing and waning  Chronicity:  New  Relieved by:  Nothing  Worsened by:  Nothing  Ineffective treatments:  None tried  Associated symptoms: headaches and sinus congestion    Associated symptoms: no chest pain, no chills, no ear pain, no eye discharge, no fever, no rash, no shortness of breath, no sore throat and no wheezing    Risk factors: chemical exposure       Review of Systems   Constitutional:  Negative for chills and fever. HENT:  Positive for congestion. Negative for ear pain, sinus pressure and sore throat. Eyes:  Negative for pain, discharge and redness. Respiratory:  Positive for cough. Negative for shortness of breath and wheezing. Cardiovascular:  Negative for chest pain. Gastrointestinal:  Positive for abdominal pain, nausea and vomiting. Negative for abdominal distention and diarrhea. Genitourinary:  Negative for dysuria and frequency. Musculoskeletal:  Negative for arthralgias and back pain. Skin:  Negative for rash and wound. Neurological:  Positive for headaches. Negative for weakness. Hematological:  Negative for adenopathy. All other systems reviewed and are negative.      Physical Exam  Constitutional:       Appearance: She is well-developed. HENT:      Head: Normocephalic and atraumatic. Mouth/Throat:      Mouth: Mucous membranes are dry. Eyes:      Extraocular Movements: Extraocular movements intact. Pupils: Pupils are equal, round, and reactive to light. Cardiovascular:      Rate and Rhythm: Normal rate and regular rhythm. Heart sounds: Normal heart sounds. Pulmonary:      Effort: Pulmonary effort is normal.      Breath sounds: Normal breath sounds. Abdominal:      General: Abdomen is flat. Bowel sounds are normal.      Palpations: Abdomen is soft. Tenderness: There is no abdominal tenderness. Comments: Early gravid abdomen felt to be appreciated on exam below the umbilicus   Skin:     General: Skin is warm. Capillary Refill: Capillary refill takes less than 2 seconds. Neurological:      General: No focal deficit present. Mental Status: She is alert and oriented to person, place, and time. Procedures     MDM  Number of Diagnoses or Management Options  COVID-19  Intrauterine pregnancy  Nausea and vomiting, unspecified vomiting type  Diagnosis management comments: Patient was seen and examined. Labs were ordered. Patient mildly tachycardic on arrival and appears dry. IV fluids were provided work-up revealed positivity for COVID-19. Fetal heart tones were reassuring patient received IV Zofran and IV fluids and had improvement. Discussed all results with patient after treatment of symptoms she was able to tolerate p.o. diet including potassium for her hypokalemia and she was felt to be ultimately safe for discharge with short course of antiemetics. Patient was discharged with outpatient follow-up with her primary care physician and OB/GYN. ED Course as of 02/23/23 1049   Wed Feb 22, 2023   1221 Patient is a 79-year-old female who presents to the emergency department with nausea vomiting diarrhea cough and generalized body aches.   The patient reports that she does live in [address] [place] [address] and several family members have had similar symptoms they have they are recovering. She was concerned due to being approximately 15 weeks pregnant and not being able to keep anything down she reports no fevers chills chest pain or shortness of breath. She reports no specific abdominal pain no abdominal cramping and no vaginal bleeding or discharge. She reports no difficulty with urination. [CF]   4076 Patient symptoms could be relation to her pregnancy, upper respiratory infection COVID-19, could also be related to a GI bug.  Multiple family members have had similar symptoms and are recovering. Will evaluate for lab work will attempt to get fetal heart tones. We will hydrate and provide antiemetics with IV Zofran and IV liter of fluids. Will reassess patient after 6 words for possible further work-up plans. Patient is noted to be tachycardic on initial evaluation. [CF]   1222 Heart Rate(!): 115 [CF]   1401 SARS-CoV-2, NAAT(!): DETECTED [CF]   1402 Updated patient on results of positive COVID test.  Did order repletion of potassium for potassium 3.1. Patient does feel improvement after IV fluids. [CF]   1402 Potassium(!): 3.1 [CF]   1533 Fetal heart tones reassuring. Patient's heart rate improving. Patient is felt to be safe for discharge after tolerating repletion of potassium. She is to continue symptomatic treatment [CF]      ED Course User Index  [CF] French Guevara, DO      --------------------------------------------- PAST HISTORY ---------------------------------------------  Past Medical History:  has a past medical history of Anxiety, Benign brain tumor ([address] Depression, and Herpes. Past Surgical History:  has a past surgical history that includes Tonsillectomy and Adenoidectomy. Social History:  reports that she has been smoking cigarettes. She has a 2.00 pack-year smoking history.  She has never used smokeless tobacco. She reports that she does not currently use alcohol. She reports that she does not currently use drugs after having used the following drugs: Marijuana (Weed).    Family History: family history includes Cancer in her maternal grandfather and paternal grandfather; Heart Attack in her mother; Kidney Disease in her father; Stroke in her mother.     The patient’s home medications have been reviewed.    Allergies: Patient has no known allergies.    -------------------------------------------------- RESULTS -------------------------------------------------  Labs:  Results for orders placed or performed during the hospital encounter of 02/22/23   COVID-19, Rapid    Specimen: Nasopharyngeal Swab   Result Value Ref Range    SARS-CoV-2, NAAT DETECTED (A) Not Detected   CBC with Auto Differential   Result Value Ref Range    WBC 7.9 4.5 - 11.5 E9/L    RBC 4.02 3.50 - 5.50 E12/L    Hemoglobin 12.6 11.5 - 15.5 g/dL    Hematocrit 36.8 34.0 - 48.0 %    MCV 91.5 80.0 - 99.9 fL    MCH 31.3 26.0 - 35.0 pg    MCHC 34.2 32.0 - 34.5 %    RDW 12.9 11.5 - 15.0 fL    Platelets 251 130 - 450 E9/L    MPV 9.6 7.0 - 12.0 fL    Neutrophils % 82.1 (H) 43.0 - 80.0 %    Immature Granulocytes % 0.4 0.0 - 5.0 %    Lymphocytes % 9.7 (L) 20.0 - 42.0 %    Monocytes % 7.5 2.0 - 12.0 %    Eosinophils % 0.0 0.0 - 6.0 %    Basophils % 0.3 0.0 - 2.0 %    Neutrophils Absolute 6.50 1.80 - 7.30 E9/L    Immature Granulocytes # 0.03 E9/L    Lymphocytes Absolute 0.77 (L) 1.50 - 4.00 E9/L    Monocytes Absolute 0.59 0.10 - 0.95 E9/L    Eosinophils Absolute 0.00 (L) 0.05 - 0.50 E9/L    Basophils Absolute 0.02 0.00 - 0.20 E9/L   Basic Metabolic Panel   Result Value Ref Range    Sodium 137 132 - 146 mmol/L    Potassium 3.1 (L) 3.5 - 5.0 mmol/L    Chloride 99 98 - 107 mmol/L    CO2 25 22 - 29 mmol/L    Anion Gap 13 7 - 16 mmol/L    Glucose 83 74 - 99 mg/dL    BUN 5 (L) 6 - 20 mg/dL    Creatinine 0.5 0.5 - 1.0 mg/dL    Est, Glom Filt Rate >60 >=60 mL/min/1.73    Calcium 8.9 8.6 - 10.2 mg/dL   Hepatic Function  Panel   Result Value Ref Range    Total Protein 6.6 6.4 - 8.3 g/dL    Albumin 3.9 3.5 - 5.2 g/dL    Alkaline Phosphatase 79 35 - 104 U/L    ALT 15 0 - 32 U/L    AST 18 0 - 31 U/L    Total Bilirubin 0.2 0.0 - 1.2 mg/dL    Bilirubin, Direct <0.2 0.0 - 0.3 mg/dL    Bilirubin, Indirect see below 0.0 - 1.0 mg/dL   Urinalysis   Result Value Ref Range    Color, UA Yellow Straw/Yellow    Clarity, UA Clear Clear    Glucose, Ur Negative Negative mg/dL    Bilirubin Urine Negative Negative    Ketones, Urine >=80 (A) Negative mg/dL    Specific Gravity, UA 1.025 1.005 - 1.030    Blood, Urine Negative Negative    pH, UA 6.0 5.0 - 9.0    Protein, UA 30 (A) Negative mg/dL    Urobilinogen, Urine 0.2 <2.0 E.U./dL    Nitrite, Urine Negative Negative    Leukocyte Esterase, Urine Negative Negative   Microscopic Urinalysis   Result Value Ref Range    WBC, UA 1-3 0 - 5 /HPF    RBC, UA NONE 0 - 2 /HPF    Epithelial Cells, UA FEW /HPF    Bacteria, UA RARE (A) None Seen /HPF       Radiology:  No orders to display       ------------------------- NURSING NOTES AND VITALS REVIEWED ---------------------------  Date / Time Roomed:  2/22/2023 11:45 AM  ED Bed Assignment:  13/13    The nursing notes within the ED encounter and vital signs as below have been reviewed. BP (!) 140/78   Pulse 92   Temp 98.7 °F (37.1 °C) (Oral)   Resp 18   Ht 5' (1.524 m)   Wt 110 lb (49.9 kg)   SpO2 100%   BMI 21.48 kg/m²   Oxygen Saturation Interpretation: Normal      ------------------------------------------ PROGRESS NOTES ------------------------------------------    I have spoken with the patient and discussed todays results, in addition to providing specific details for the plan of care and counseling regarding the diagnosis and prognosis. Their questions are answered at this time and they are agreeable with the plan. I discussed at length with them reasons for immediate return here for re evaluation.  They will followup with their primary care physician by calling their office on Monday.      --------------------------------- ADDITIONAL PROVIDER NOTES ---------------------------------  At this time the patient is without objective evidence of an acute process requiring hospitalization or inpatient management. They have remained hemodynamically stable throughout their entire ED visit and are stable for discharge with outpatient follow-up. The plan has been discussed in detail and they are aware of the specific conditions for emergent return, as well as the importance of follow-up. Discharge Medication List as of 2/22/2023  3:34 PM          Diagnosis:  1. COVID-19    2. Intrauterine pregnancy    3. Nausea and vomiting, unspecified vomiting type        Disposition:  Patient's disposition: Discharge to home  Patient's condition is stable.        Sheila Thomson DO  02/23/23 1052

## 2023-07-15 ENCOUNTER — HOSPITAL ENCOUNTER (INPATIENT)
Age: 28
LOS: 2 days | Discharge: HOME | DRG: 560 | End: 2023-07-17
Attending: OBSTETRICS & GYNECOLOGY | Admitting: STUDENT IN AN ORGANIZED HEALTH CARE EDUCATION/TRAINING PROGRAM
Payer: COMMERCIAL

## 2023-07-15 LAB
ALBUMIN SERPL-MCNC: 3.4 G/DL (ref 3.5–5.2)
ALP SERPL-CCNC: 206 U/L (ref 35–104)
ALT SERPL-CCNC: 19 U/L (ref 0–32)
AMPHET UR QL SCN: POSITIVE
ANION GAP SERPL CALCULATED.3IONS-SCNC: 13 MMOL/L (ref 7–16)
AST SERPL-CCNC: 44 U/L (ref 0–31)
BARBITURATES UR QL SCN: NEGATIVE
BENZODIAZ UR QL: NEGATIVE
BILIRUB SERPL-MCNC: 0.3 MG/DL (ref 0–1.2)
BUN SERPL-MCNC: 7 MG/DL (ref 6–20)
CALCIUM SERPL-MCNC: 9.2 MG/DL (ref 8.6–10.2)
CANNABINOIDS UR QL SCN: POSITIVE
CHLORIDE SERPL-SCNC: 101 MMOL/L (ref 98–107)
CO2 SERPL-SCNC: 17 MMOL/L (ref 22–29)
COCAINE UR QL SCN: NEGATIVE
CREAT SERPL-MCNC: 0.6 MG/DL (ref 0.5–1)
ERYTHROCYTE [DISTWIDTH] IN BLOOD BY AUTOMATED COUNT: 13.1 % (ref 11.5–15)
FENTANYL UR QL: NEGATIVE
GFR SERPL CREATININE-BSD FRML MDRD: >60 ML/MIN/1.73M2
GLUCOSE SERPL-MCNC: 82 MG/DL (ref 74–99)
HCT VFR BLD AUTO: 35.1 % (ref 34–48)
HGB BLD-MCNC: 11.8 G/DL (ref 11.5–15.5)
HGB BLD-MCNC: 9.4 G/DL (ref 11.5–15.5)
HIV1+2 AB SERPLBLD QL IA.RAPID: NONREACTIVE
MCH RBC QN AUTO: 31 PG (ref 26–35)
MCHC RBC AUTO-ENTMCNC: 33.6 G/DL (ref 32–34.5)
MCV RBC AUTO: 92.1 FL (ref 80–99.9)
METHADONE UR QL: NEGATIVE
OPIATES UR QL SCN: NEGATIVE
OXYCODONE UR QL SCN: NEGATIVE
PCP UR QL SCN: NEGATIVE
PLATELET # BLD AUTO: 348 K/UL (ref 130–450)
PMV BLD AUTO: 11.1 FL (ref 7–12)
POTASSIUM SERPL-SCNC: 5.9 MMOL/L (ref 3.5–5)
PROT SERPL-MCNC: 7 G/DL (ref 6.4–8.3)
RBC # BLD AUTO: 3.81 M/UL (ref 3.5–5.5)
SODIUM SERPL-SCNC: 131 MMOL/L (ref 132–146)
TEST INFORMATION: ABNORMAL
URATE SERPL-MCNC: 6.6 MG/DL (ref 2.4–5.7)
WBC OTHER # BLD: 12.6 K/UL (ref 4.5–11.5)

## 2023-07-15 PROCEDURE — 80053 COMPREHEN METABOLIC PANEL: CPT

## 2023-07-15 PROCEDURE — 6370000000 HC RX 637 (ALT 250 FOR IP): Performed by: OBSTETRICS & GYNECOLOGY

## 2023-07-15 PROCEDURE — G0480 DRUG TEST DEF 1-7 CLASSES: HCPCS

## 2023-07-15 PROCEDURE — 6370000000 HC RX 637 (ALT 250 FOR IP): Performed by: STUDENT IN AN ORGANIZED HEALTH CARE EDUCATION/TRAINING PROGRAM

## 2023-07-15 PROCEDURE — 84550 ASSAY OF BLOOD/URIC ACID: CPT

## 2023-07-15 PROCEDURE — 88307 TISSUE EXAM BY PATHOLOGIST: CPT

## 2023-07-15 PROCEDURE — 86701 HIV-1ANTIBODY: CPT

## 2023-07-15 PROCEDURE — 6360000002 HC RX W HCPCS

## 2023-07-15 PROCEDURE — 80307 DRUG TEST PRSMV CHEM ANLYZR: CPT

## 2023-07-15 PROCEDURE — 86901 BLOOD TYPING SEROLOGIC RH(D): CPT

## 2023-07-15 PROCEDURE — 6360000002 HC RX W HCPCS: Performed by: OBSTETRICS & GYNECOLOGY

## 2023-07-15 PROCEDURE — 6360000002 HC RX W HCPCS: Performed by: STUDENT IN AN ORGANIZED HEALTH CARE EDUCATION/TRAINING PROGRAM

## 2023-07-15 PROCEDURE — 2580000003 HC RX 258: Performed by: OBSTETRICS & GYNECOLOGY

## 2023-07-15 PROCEDURE — 85018 HEMOGLOBIN: CPT

## 2023-07-15 PROCEDURE — 80074 ACUTE HEPATITIS PANEL: CPT

## 2023-07-15 PROCEDURE — 86900 BLOOD TYPING SEROLOGIC ABO: CPT

## 2023-07-15 PROCEDURE — 85027 COMPLETE CBC AUTOMATED: CPT

## 2023-07-15 PROCEDURE — 7200000001 HC VAGINAL DELIVERY

## 2023-07-15 PROCEDURE — 86850 RBC ANTIBODY SCREEN: CPT

## 2023-07-15 PROCEDURE — 86923 COMPATIBILITY TEST ELECTRIC: CPT

## 2023-07-15 PROCEDURE — 0HQ9XZZ REPAIR PERINEUM SKIN, EXTERNAL APPROACH: ICD-10-PCS | Performed by: OBSTETRICS & GYNECOLOGY

## 2023-07-15 PROCEDURE — 1220000001 HC SEMI PRIVATE L&D R&B

## 2023-07-15 RX ORDER — ACETAMINOPHEN 500 MG
1000 TABLET ORAL EVERY 6 HOURS PRN
Status: DISCONTINUED | OUTPATIENT
Start: 2023-07-15 | End: 2023-07-17 | Stop reason: HOSPADM

## 2023-07-15 RX ORDER — MODIFIED LANOLIN
OINTMENT (GRAM) TOPICAL PRN
Status: DISCONTINUED | OUTPATIENT
Start: 2023-07-15 | End: 2023-07-17 | Stop reason: HOSPADM

## 2023-07-15 RX ORDER — SODIUM CHLORIDE 0.9 % (FLUSH) 0.9 %
5-40 SYRINGE (ML) INJECTION EVERY 12 HOURS SCHEDULED
Status: DISCONTINUED | OUTPATIENT
Start: 2023-07-15 | End: 2023-07-17 | Stop reason: HOSPADM

## 2023-07-15 RX ORDER — SODIUM CHLORIDE, SODIUM LACTATE, POTASSIUM CHLORIDE, CALCIUM CHLORIDE 600; 310; 30; 20 MG/100ML; MG/100ML; MG/100ML; MG/100ML
INJECTION, SOLUTION INTRAVENOUS CONTINUOUS
Status: DISCONTINUED | OUTPATIENT
Start: 2023-07-15 | End: 2023-07-17 | Stop reason: HOSPADM

## 2023-07-15 RX ORDER — PENICILLIN G POTASSIUM 5000000 [IU]/1
INJECTION, POWDER, FOR SOLUTION INTRAMUSCULAR; INTRAVENOUS
Status: DISCONTINUED
Start: 2023-07-15 | End: 2023-07-15

## 2023-07-15 RX ORDER — NIFEDIPINE 30 MG/1
30 TABLET, FILM COATED, EXTENDED RELEASE ORAL DAILY
Status: DISCONTINUED | OUTPATIENT
Start: 2023-07-15 | End: 2023-07-16 | Stop reason: ALTCHOICE

## 2023-07-15 RX ORDER — SODIUM CHLORIDE 9 MG/ML
25 INJECTION, SOLUTION INTRAVENOUS PRN
Status: DISCONTINUED | OUTPATIENT
Start: 2023-07-15 | End: 2023-07-15

## 2023-07-15 RX ORDER — LIDOCAINE HYDROCHLORIDE 10 MG/ML
INJECTION, SOLUTION INFILTRATION; PERINEURAL
Status: DISCONTINUED
Start: 2023-07-15 | End: 2023-07-15

## 2023-07-15 RX ORDER — SODIUM CHLORIDE, SODIUM LACTATE, POTASSIUM CHLORIDE, CALCIUM CHLORIDE 600; 310; 30; 20 MG/100ML; MG/100ML; MG/100ML; MG/100ML
INJECTION, SOLUTION INTRAVENOUS CONTINUOUS
Status: DISCONTINUED | OUTPATIENT
Start: 2023-07-15 | End: 2023-07-15

## 2023-07-15 RX ORDER — SODIUM CHLORIDE 0.9 % (FLUSH) 0.9 %
5-40 SYRINGE (ML) INJECTION EVERY 12 HOURS SCHEDULED
Status: DISCONTINUED | OUTPATIENT
Start: 2023-07-15 | End: 2023-07-15

## 2023-07-15 RX ORDER — ONDANSETRON 2 MG/ML
4 INJECTION INTRAMUSCULAR; INTRAVENOUS EVERY 6 HOURS PRN
Status: DISCONTINUED | OUTPATIENT
Start: 2023-07-15 | End: 2023-07-15

## 2023-07-15 RX ORDER — SODIUM CHLORIDE, SODIUM LACTATE, POTASSIUM CHLORIDE, AND CALCIUM CHLORIDE .6; .31; .03; .02 G/100ML; G/100ML; G/100ML; G/100ML
1000 INJECTION, SOLUTION INTRAVENOUS PRN
Status: DISCONTINUED | OUTPATIENT
Start: 2023-07-15 | End: 2023-07-15

## 2023-07-15 RX ORDER — MISOPROSTOL 200 UG/1
800 TABLET ORAL PRN
Status: DISCONTINUED | OUTPATIENT
Start: 2023-07-15 | End: 2023-07-15

## 2023-07-15 RX ORDER — SODIUM CHLORIDE 0.9 % (FLUSH) 0.9 %
5-40 SYRINGE (ML) INJECTION PRN
Status: DISCONTINUED | OUTPATIENT
Start: 2023-07-15 | End: 2023-07-17 | Stop reason: HOSPADM

## 2023-07-15 RX ORDER — MAGNESIUM SULFATE HEPTAHYDRATE 40 MG/ML
4000 INJECTION, SOLUTION INTRAVENOUS ONCE
Status: COMPLETED | OUTPATIENT
Start: 2023-07-15 | End: 2023-07-15

## 2023-07-15 RX ORDER — METOCLOPRAMIDE HYDROCHLORIDE 5 MG/ML
10 INJECTION INTRAMUSCULAR; INTRAVENOUS ONCE
Status: COMPLETED | OUTPATIENT
Start: 2023-07-15 | End: 2023-07-15

## 2023-07-15 RX ORDER — ONDANSETRON 4 MG/1
8 TABLET, ORALLY DISINTEGRATING ORAL EVERY 8 HOURS PRN
Status: DISCONTINUED | OUTPATIENT
Start: 2023-07-15 | End: 2023-07-17 | Stop reason: HOSPADM

## 2023-07-15 RX ORDER — SODIUM CHLORIDE 9 MG/ML
INJECTION, SOLUTION INTRAVENOUS PRN
Status: DISCONTINUED | OUTPATIENT
Start: 2023-07-15 | End: 2023-07-17 | Stop reason: HOSPADM

## 2023-07-15 RX ORDER — DOCUSATE SODIUM 100 MG/1
100 CAPSULE, LIQUID FILLED ORAL 2 TIMES DAILY
Status: DISCONTINUED | OUTPATIENT
Start: 2023-07-15 | End: 2023-07-17 | Stop reason: HOSPADM

## 2023-07-15 RX ORDER — SODIUM CHLORIDE, SODIUM LACTATE, POTASSIUM CHLORIDE, AND CALCIUM CHLORIDE .6; .31; .03; .02 G/100ML; G/100ML; G/100ML; G/100ML
500 INJECTION, SOLUTION INTRAVENOUS PRN
Status: DISCONTINUED | OUTPATIENT
Start: 2023-07-15 | End: 2023-07-15

## 2023-07-15 RX ORDER — SODIUM CHLORIDE 0.9 % (FLUSH) 0.9 %
5-40 SYRINGE (ML) INJECTION PRN
Status: DISCONTINUED | OUTPATIENT
Start: 2023-07-15 | End: 2023-07-15

## 2023-07-15 RX ORDER — CALCIUM GLUCONATE 94 MG/ML
1000 INJECTION, SOLUTION INTRAVENOUS PRN
Status: DISCONTINUED | OUTPATIENT
Start: 2023-07-15 | End: 2023-07-17 | Stop reason: HOSPADM

## 2023-07-15 RX ORDER — ACETAMINOPHEN 650 MG
TABLET, EXTENDED RELEASE ORAL
Status: DISCONTINUED
Start: 2023-07-15 | End: 2023-07-15

## 2023-07-15 RX ORDER — IBUPROFEN 600 MG/1
600 TABLET ORAL EVERY 6 HOURS PRN
Status: DISCONTINUED | OUTPATIENT
Start: 2023-07-15 | End: 2023-07-17 | Stop reason: HOSPADM

## 2023-07-15 RX ORDER — ONDANSETRON 2 MG/ML
4 INJECTION INTRAMUSCULAR; INTRAVENOUS EVERY 6 HOURS PRN
Status: DISCONTINUED | OUTPATIENT
Start: 2023-07-15 | End: 2023-07-17 | Stop reason: HOSPADM

## 2023-07-15 RX ORDER — PRENATAL WITH FERROUS FUM AND FOLIC ACID 3080; 920; 120; 400; 22; 1.84; 3; 20; 10; 1; 12; 200; 27; 25; 2 [IU]/1; [IU]/1; MG/1; [IU]/1; MG/1; MG/1; MG/1; MG/1; MG/1; MG/1; UG/1; MG/1; MG/1; MG/1; MG/1
1 TABLET ORAL DAILY
Status: DISCONTINUED | OUTPATIENT
Start: 2023-07-15 | End: 2023-07-17 | Stop reason: HOSPADM

## 2023-07-15 RX ADMIN — SODIUM CHLORIDE, POTASSIUM CHLORIDE, SODIUM LACTATE AND CALCIUM CHLORIDE: 600; 310; 30; 20 INJECTION, SOLUTION INTRAVENOUS at 11:50

## 2023-07-15 RX ADMIN — IBUPROFEN 600 MG: 600 TABLET, FILM COATED ORAL at 22:06

## 2023-07-15 RX ADMIN — ONDANSETRON 4 MG: 2 INJECTION INTRAMUSCULAR; INTRAVENOUS at 15:04

## 2023-07-15 RX ADMIN — Medication 166.7 ML: at 12:31

## 2023-07-15 RX ADMIN — DOCUSATE SODIUM 100 MG: 100 CAPSULE, LIQUID FILLED ORAL at 22:14

## 2023-07-15 RX ADMIN — MAGNESIUM SULFATE IN WATER 4000 MG: 40 INJECTION, SOLUTION INTRAVENOUS at 14:38

## 2023-07-15 RX ADMIN — NIFEDIPINE 30 MG: 30 TABLET, EXTENDED RELEASE ORAL at 13:44

## 2023-07-15 RX ADMIN — DEXTROSE MONOHYDRATE 5 MILLION UNITS: 50 INJECTION, SOLUTION INTRAVENOUS at 11:58

## 2023-07-15 RX ADMIN — Medication 87.3 MILLI-UNITS/MIN: at 16:51

## 2023-07-15 RX ADMIN — Medication 87.3 MILLI-UNITS/MIN: at 23:18

## 2023-07-15 RX ADMIN — METOCLOPRAMIDE 10 MG: 5 INJECTION, SOLUTION INTRAMUSCULAR; INTRAVENOUS at 22:14

## 2023-07-15 RX ADMIN — MAGNESIUM SULFATE HEPTAHYDRATE 2000 MG/HR: 40 INJECTION, SOLUTION INTRAVENOUS at 15:02

## 2023-07-15 RX ADMIN — IBUPROFEN 600 MG: 600 TABLET, FILM COATED ORAL at 13:44

## 2023-07-15 RX ADMIN — ACETAMINOPHEN 1000 MG: 500 TABLET ORAL at 19:48

## 2023-07-15 RX ADMIN — Medication 87.3 MILLI-UNITS/MIN: at 19:56

## 2023-07-15 RX ADMIN — Medication 166.7 ML: at 15:23

## 2023-07-15 ASSESSMENT — PAIN DESCRIPTION - DESCRIPTORS
DESCRIPTORS: ACHING
DESCRIPTORS: ACHING
DESCRIPTORS: CRAMPING
DESCRIPTORS: CRAMPING

## 2023-07-15 ASSESSMENT — PAIN - FUNCTIONAL ASSESSMENT: PAIN_FUNCTIONAL_ASSESSMENT: ACTIVITIES ARE NOT PREVENTED

## 2023-07-15 ASSESSMENT — PAIN SCALES - GENERAL
PAINLEVEL_OUTOF10: 5
PAINLEVEL_OUTOF10: 5

## 2023-07-15 ASSESSMENT — PAIN DESCRIPTION - LOCATION
LOCATION: ABDOMEN
LOCATION: HEAD

## 2023-07-15 ASSESSMENT — PAIN DESCRIPTION - ORIENTATION: ORIENTATION: ANTERIOR

## 2023-07-16 LAB
ERYTHROCYTE [DISTWIDTH] IN BLOOD BY AUTOMATED COUNT: 13.3 % (ref 11.5–15)
HCT VFR BLD AUTO: 23.6 % (ref 34–48)
HGB BLD-MCNC: 7.9 G/DL (ref 11.5–15.5)
MCH RBC QN AUTO: 31 PG (ref 26–35)
MCHC RBC AUTO-ENTMCNC: 33.5 G/DL (ref 32–34.5)
MCV RBC AUTO: 92.5 FL (ref 80–99.9)
PLATELET # BLD AUTO: 264 K/UL (ref 130–450)
PMV BLD AUTO: 11.1 FL (ref 7–12)
RBC # BLD AUTO: 2.55 M/UL (ref 3.5–5.5)
WBC OTHER # BLD: 11.6 K/UL (ref 4.5–11.5)

## 2023-07-16 PROCEDURE — 99232 SBSQ HOSP IP/OBS MODERATE 35: CPT | Performed by: STUDENT IN AN ORGANIZED HEALTH CARE EDUCATION/TRAINING PROGRAM

## 2023-07-16 PROCEDURE — 6370000000 HC RX 637 (ALT 250 FOR IP): Performed by: OBSTETRICS & GYNECOLOGY

## 2023-07-16 PROCEDURE — 6360000002 HC RX W HCPCS: Performed by: OBSTETRICS & GYNECOLOGY

## 2023-07-16 PROCEDURE — 6370000000 HC RX 637 (ALT 250 FOR IP): Performed by: STUDENT IN AN ORGANIZED HEALTH CARE EDUCATION/TRAINING PROGRAM

## 2023-07-16 PROCEDURE — 1220000000 HC SEMI PRIVATE OB R&B

## 2023-07-16 PROCEDURE — 85027 COMPLETE CBC AUTOMATED: CPT

## 2023-07-16 RX ORDER — LABETALOL HYDROCHLORIDE 5 MG/ML
80 INJECTION, SOLUTION INTRAVENOUS
Status: ACTIVE | OUTPATIENT
Start: 2023-07-16 | End: 2023-07-17

## 2023-07-16 RX ORDER — SODIUM CHLORIDE 0.9 % (FLUSH) 0.9 %
5-40 SYRINGE (ML) INJECTION PRN
Status: DISCONTINUED | OUTPATIENT
Start: 2023-07-16 | End: 2023-07-17 | Stop reason: HOSPADM

## 2023-07-16 RX ORDER — HYDRALAZINE HYDROCHLORIDE 20 MG/ML
10 INJECTION INTRAMUSCULAR; INTRAVENOUS
Status: ACTIVE | OUTPATIENT
Start: 2023-07-16 | End: 2023-07-17

## 2023-07-16 RX ORDER — SODIUM CHLORIDE 0.9 % (FLUSH) 0.9 %
5-40 SYRINGE (ML) INJECTION EVERY 12 HOURS SCHEDULED
Status: DISCONTINUED | OUTPATIENT
Start: 2023-07-16 | End: 2023-07-17 | Stop reason: HOSPADM

## 2023-07-16 RX ORDER — LABETALOL HYDROCHLORIDE 5 MG/ML
20 INJECTION, SOLUTION INTRAVENOUS
Status: ACTIVE | OUTPATIENT
Start: 2023-07-16 | End: 2023-07-17

## 2023-07-16 RX ORDER — NIFEDIPINE 30 MG/1
60 TABLET, FILM COATED, EXTENDED RELEASE ORAL DAILY
Status: DISCONTINUED | OUTPATIENT
Start: 2023-07-16 | End: 2023-07-17 | Stop reason: HOSPADM

## 2023-07-16 RX ORDER — LABETALOL HYDROCHLORIDE 5 MG/ML
40 INJECTION, SOLUTION INTRAVENOUS
Status: ACTIVE | OUTPATIENT
Start: 2023-07-16 | End: 2023-07-17

## 2023-07-16 RX ORDER — OXYTOCIN 10 [USP'U]/ML
INJECTION, SOLUTION INTRAMUSCULAR; INTRAVENOUS
Status: DISPENSED
Start: 2023-07-16 | End: 2023-07-16

## 2023-07-16 RX ORDER — FERROUS SULFATE 325(65) MG
325 TABLET ORAL 2 TIMES DAILY WITH MEALS
Status: DISCONTINUED | OUTPATIENT
Start: 2023-07-16 | End: 2023-07-17 | Stop reason: HOSPADM

## 2023-07-16 RX ORDER — SODIUM CHLORIDE, SODIUM LACTATE, POTASSIUM CHLORIDE, CALCIUM CHLORIDE 600; 310; 30; 20 MG/100ML; MG/100ML; MG/100ML; MG/100ML
INJECTION, SOLUTION INTRAVENOUS CONTINUOUS
Status: DISCONTINUED | OUTPATIENT
Start: 2023-07-16 | End: 2023-07-17 | Stop reason: HOSPADM

## 2023-07-16 RX ORDER — SODIUM CHLORIDE 9 MG/ML
INJECTION, SOLUTION INTRAVENOUS PRN
Status: DISCONTINUED | OUTPATIENT
Start: 2023-07-16 | End: 2023-07-17 | Stop reason: HOSPADM

## 2023-07-16 RX ADMIN — DOCUSATE SODIUM 100 MG: 100 CAPSULE, LIQUID FILLED ORAL at 10:29

## 2023-07-16 RX ADMIN — NIFEDIPINE 60 MG: 30 TABLET, EXTENDED RELEASE ORAL at 10:30

## 2023-07-16 RX ADMIN — IBUPROFEN 600 MG: 600 TABLET, FILM COATED ORAL at 15:37

## 2023-07-16 RX ADMIN — DOCUSATE SODIUM 100 MG: 100 CAPSULE, LIQUID FILLED ORAL at 22:00

## 2023-07-16 RX ADMIN — Medication 87.3 MILLI-UNITS/MIN: at 05:06

## 2023-07-16 RX ADMIN — FERROUS SULFATE TAB 325 MG (65 MG ELEMENTAL FE) 325 MG: 325 (65 FE) TAB at 22:00

## 2023-07-16 RX ADMIN — ONDANSETRON 4 MG: 2 INJECTION INTRAMUSCULAR; INTRAVENOUS at 12:06

## 2023-07-16 RX ADMIN — ACETAMINOPHEN 1000 MG: 500 TABLET ORAL at 22:06

## 2023-07-16 RX ADMIN — PRENATAL WITH FERROUS FUM AND FOLIC ACID 1 TABLET: 3080; 920; 120; 400; 22; 1.84; 3; 20; 10; 1; 12; 200; 27; 25; 2 TABLET ORAL at 15:38

## 2023-07-16 RX ADMIN — ACETAMINOPHEN 1000 MG: 500 TABLET ORAL at 10:30

## 2023-07-16 RX ADMIN — MAGNESIUM SULFATE HEPTAHYDRATE 2000 MG/HR: 40 INJECTION, SOLUTION INTRAVENOUS at 01:42

## 2023-07-16 RX ADMIN — MAGNESIUM SULFATE HEPTAHYDRATE 2000 MG/HR: 40 INJECTION, SOLUTION INTRAVENOUS at 11:19

## 2023-07-16 RX ADMIN — FERROUS SULFATE TAB 325 MG (65 MG ELEMENTAL FE) 325 MG: 325 (65 FE) TAB at 10:29

## 2023-07-16 RX ADMIN — ACETAMINOPHEN 1000 MG: 500 TABLET ORAL at 16:08

## 2023-07-16 RX ADMIN — DOCUSATE SODIUM 100 MG: 100 CAPSULE, LIQUID FILLED ORAL at 15:38

## 2023-07-16 RX ADMIN — PRENATAL WITH FERROUS FUM AND FOLIC ACID 1 TABLET: 3080; 920; 120; 400; 22; 1.84; 3; 20; 10; 1; 12; 200; 27; 25; 2 TABLET ORAL at 10:32

## 2023-07-16 ASSESSMENT — PAIN DESCRIPTION - FREQUENCY
FREQUENCY: INTERMITTENT
FREQUENCY: INTERMITTENT

## 2023-07-16 ASSESSMENT — PAIN DESCRIPTION - LOCATION
LOCATION: HEAD

## 2023-07-16 ASSESSMENT — PAIN SCALES - GENERAL
PAINLEVEL_OUTOF10: 5
PAINLEVEL_OUTOF10: 8
PAINLEVEL_OUTOF10: 6
PAINLEVEL_OUTOF10: 0
PAINLEVEL_OUTOF10: 7

## 2023-07-16 ASSESSMENT — PAIN DESCRIPTION - DESCRIPTORS
DESCRIPTORS: ACHING
DESCRIPTORS: THROBBING
DESCRIPTORS: ACHING

## 2023-07-16 ASSESSMENT — PAIN DESCRIPTION - PAIN TYPE: TYPE: ACUTE PAIN

## 2023-07-17 VITALS
TEMPERATURE: 98.6 F | HEIGHT: 60 IN | OXYGEN SATURATION: 98 % | HEART RATE: 88 BPM | WEIGHT: 140 LBS | RESPIRATION RATE: 18 BRPM | DIASTOLIC BLOOD PRESSURE: 76 MMHG | SYSTOLIC BLOOD PRESSURE: 122 MMHG | BODY MASS INDEX: 27.48 KG/M2

## 2023-07-17 LAB
ABNORMAL SPECIMEN VALIDITY TEST: NORMAL
HAV IGM SERPL QL IA: NONREACTIVE
HBV CORE IGM SERPL QL IA: NONREACTIVE
HBV SURFACE AG SERPL QL IA: NONREACTIVE
HCV AB SERPL QL IA: NONREACTIVE
INTEGRITY CHECK, CREATININE, URINE: 106.6
INTEGRITY CHECK, OXIDANT, URINE: 24
INTEGRITY CHECK, PH, URINE: 6.7
INTEGRITY CHECK, SPECIFIC GRAVITY, URINE: 1.01

## 2023-07-17 PROCEDURE — 90715 TDAP VACCINE 7 YRS/> IM: CPT | Performed by: OBSTETRICS & GYNECOLOGY

## 2023-07-17 PROCEDURE — 6370000000 HC RX 637 (ALT 250 FOR IP): Performed by: OBSTETRICS & GYNECOLOGY

## 2023-07-17 PROCEDURE — 6370000000 HC RX 637 (ALT 250 FOR IP): Performed by: STUDENT IN AN ORGANIZED HEALTH CARE EDUCATION/TRAINING PROGRAM

## 2023-07-17 PROCEDURE — 90471 IMMUNIZATION ADMIN: CPT | Performed by: OBSTETRICS & GYNECOLOGY

## 2023-07-17 PROCEDURE — 2580000003 HC RX 258: Performed by: OBSTETRICS & GYNECOLOGY

## 2023-07-17 PROCEDURE — 99238 HOSP IP/OBS DSCHRG MGMT 30/<: CPT | Performed by: FAMILY MEDICINE

## 2023-07-17 PROCEDURE — 6360000002 HC RX W HCPCS: Performed by: OBSTETRICS & GYNECOLOGY

## 2023-07-17 RX ORDER — IBUPROFEN 600 MG/1
600 TABLET ORAL EVERY 6 HOURS PRN
Qty: 120 TABLET | Refills: 0 | Status: SHIPPED | OUTPATIENT
Start: 2023-07-17

## 2023-07-17 RX ORDER — FERROUS SULFATE 325(65) MG
325 TABLET ORAL 2 TIMES DAILY WITH MEALS
Qty: 60 TABLET | Refills: 0 | Status: SHIPPED | OUTPATIENT
Start: 2023-07-17

## 2023-07-17 RX ORDER — NIFEDIPINE 30 MG/1
60 TABLET, FILM COATED, EXTENDED RELEASE ORAL DAILY
Qty: 60 TABLET | Refills: 0 | Status: SHIPPED | OUTPATIENT
Start: 2023-07-18

## 2023-07-17 RX ADMIN — SODIUM CHLORIDE, PRESERVATIVE FREE 10 ML: 5 INJECTION INTRAVENOUS at 09:48

## 2023-07-17 RX ADMIN — ACETAMINOPHEN 1000 MG: 500 TABLET ORAL at 09:47

## 2023-07-17 RX ADMIN — NIFEDIPINE 60 MG: 30 TABLET, EXTENDED RELEASE ORAL at 09:47

## 2023-07-17 RX ADMIN — PRENATAL WITH FERROUS FUM AND FOLIC ACID 1 TABLET: 3080; 920; 120; 400; 22; 1.84; 3; 20; 10; 1; 12; 200; 27; 25; 2 TABLET ORAL at 09:47

## 2023-07-17 RX ADMIN — Medication: at 09:49

## 2023-07-17 RX ADMIN — TETANUS TOXOID, REDUCED DIPHTHERIA TOXOID AND ACELLULAR PERTUSSIS VACCINE, ADSORBED 0.5 ML: 5; 2.5; 8; 8; 2.5 SUSPENSION INTRAMUSCULAR at 12:59

## 2023-07-17 RX ADMIN — FERROUS SULFATE TAB 325 MG (65 MG ELEMENTAL FE) 325 MG: 325 (65 FE) TAB at 17:18

## 2023-07-17 RX ADMIN — ACETAMINOPHEN 1000 MG: 500 TABLET ORAL at 17:18

## 2023-07-17 RX ADMIN — DOCUSATE SODIUM 100 MG: 100 CAPSULE, LIQUID FILLED ORAL at 09:47

## 2023-07-17 RX ADMIN — IBUPROFEN 600 MG: 600 TABLET, FILM COATED ORAL at 12:51

## 2023-07-17 RX ADMIN — FERROUS SULFATE TAB 325 MG (65 MG ELEMENTAL FE) 325 MG: 325 (65 FE) TAB at 09:47

## 2023-07-17 ASSESSMENT — PAIN DESCRIPTION - LOCATION
LOCATION: HEAD
LOCATION: ABDOMEN
LOCATION: HEAD;ABDOMEN

## 2023-07-17 ASSESSMENT — PAIN DESCRIPTION - DESCRIPTORS
DESCRIPTORS: ACHING;DISCOMFORT
DESCRIPTORS: DISCOMFORT;SORE
DESCRIPTORS: CRAMPING;ACHING

## 2023-07-17 ASSESSMENT — PAIN - FUNCTIONAL ASSESSMENT
PAIN_FUNCTIONAL_ASSESSMENT: ACTIVITIES ARE NOT PREVENTED

## 2023-07-17 ASSESSMENT — PAIN DESCRIPTION - ORIENTATION
ORIENTATION: LOWER
ORIENTATION: LOWER;RIGHT;LEFT;ANTERIOR
ORIENTATION: ANTERIOR;OTHER (COMMENT)

## 2023-07-17 ASSESSMENT — PAIN DESCRIPTION - FREQUENCY
FREQUENCY: INTERMITTENT
FREQUENCY: INTERMITTENT

## 2023-07-17 ASSESSMENT — PAIN SCALES - GENERAL
PAINLEVEL_OUTOF10: 8
PAINLEVEL_OUTOF10: 1
PAINLEVEL_OUTOF10: 2

## 2023-07-17 ASSESSMENT — PAIN DESCRIPTION - PAIN TYPE
TYPE: ACUTE PAIN
TYPE: ACUTE PAIN

## 2023-07-17 ASSESSMENT — PAIN DESCRIPTION - ONSET
ONSET: GRADUAL
ONSET: GRADUAL

## 2023-07-17 NOTE — DISCHARGE INSTRUCTIONS
having flu-like symptoms such as achy muscles or joints. There is a foul smell or a green color to your vaginal bleeding. If you have pain that cannot be relieved. You have persistent burning with urination or frequency. Call if you have concerns about your well-being. You are unable to sleep, eat, or are having thoughts of harming yourself or your baby. You have swelling, bleeding, drainage, foul odor, redness, or warmth in/around your incision or stitches. You have a red, warm, tender area in you calf.

## 2023-07-17 NOTE — FLOWSHEET NOTE
Voicemail message left for Dr. Bridget Lawson notifying her that patient is interested in being discharged today if medically ready.

## 2023-07-17 NOTE — PLAN OF CARE
Problem: Pain  Goal: Verbalizes/displays adequate comfort level or baseline comfort level  Outcome: Progressing     Problem: Postpartum  Goal: Experiences normal postpartum course  Description:  Postpartum OB-Pregnancy care plan goal which identifies if the mother is experiencing a normal postpartum course  Outcome: Progressing  Goal: Incisions, wounds, or drain sites healing without S/S of infection  Outcome: Progressing     Problem: Infection - Adult  Goal: Absence of infection at discharge  Outcome: Progressing  Goal: Absence of infection during hospitalization  Outcome: Progressing  Goal: Absence of fever/infection during anticipated neutropenic period  Outcome: Progressing     Problem: Safety - Adult  Goal: Free from fall injury  Outcome: Progressing

## 2023-07-17 NOTE — DISCHARGE SUMMARY
Obstetrical Discharge Form    Primary OB Clinician: Gabriela Romo      [facility]: Estimated Date of Delivery: 23    Gestational Age:32w3d    Antepartum complications:  labor; HSV no lesions    Date of Delivery: 7/15/23 ; Time of Delivery: 1227     Delivered By: Belinda Posada MD     Delivery Type: spontaneous vaginal delivery    Tubal Ligation: n/a    Baby: Liveborn male, Apgars 8/9, weight  2060g    Anesthesia: None    Intrapartum complications: Pre-eclampsia with severe features, Postpartum Hemorrhage, and  Labor    Laceration: 1st degree    Episiotomy: none,    Placenta: spontaneous    Feeding method: breast and bottle    Rh Immune globulin given: no    Rubella vaccine given: no    Discharge Date: 23;    Early Discharge:  NO    Plan:     Address and phone number verified and same. Follow-up appointment with primary provider in 2 weeks.

## 2023-07-17 NOTE — FLOWSHEET NOTE
Patient states that she doesn't have a BP cuff at home for BP monitoring. No BP cuff available at this time to give to patient. Patient instructed to purchase a BP cuff or to contact her insurance company to request a BP cuff for home monitoring with verbalized understanding.

## 2023-07-18 LAB
AMPHET UR-MCNC: >1000 NG/ML
EPHEDRIN UR QL: <100 NG/ML
MDA, QUANTITATIVE, URINE: <100 NG/ML
MDEA, QUANTITATIVE, URINE: <100 NG/ML
MDMA UR QL: <100 NG/ML
METHAMPHETAMINE QUANTITATIVE URINE: 397.7 NG/ML
PHENTERMINE, URINE, QUANTITATIVE: <100 NG/ML
THC NORMALIZED, QUANTITIATIVE, URINE: ABNORMAL NG/ML
THC-COOH, QUANTITATIVE, URINE: >1000 NG/ML

## 2023-07-18 NOTE — PROGRESS NOTES
CLINICAL PHARMACY NOTE: MEDS TO BEDS    Total # of Prescriptions Filled: 3   The following medications were delivered to the patient:  Ibuprofen 600 mg  Nifedipine 30 mg  Ferosul 325 mg    Additional Documentation:

## 2023-07-19 LAB
ABO/RH: NORMAL
ANTIBODY SCREEN: NEGATIVE
ARM BAND NUMBER: NORMAL
BLOOD BANK DISPENSE STATUS: NORMAL
BLOOD BANK DISPENSE STATUS: NORMAL
BLOOD BANK SAMPLE EXPIRATION: NORMAL
BPU ID: NORMAL
BPU ID: NORMAL
COMPONENT: NORMAL
COMPONENT: NORMAL
CROSSMATCH RESULT: NORMAL
CROSSMATCH RESULT: NORMAL
TRANSFUSION STATUS: NORMAL
TRANSFUSION STATUS: NORMAL
UNIT DIVISION: 0
UNIT DIVISION: 0

## 2023-07-21 LAB — SURGICAL PATHOLOGY REPORT: NORMAL

## 2023-12-06 NOTE — CARE COORDINATION
SW Discharge Planning     ASHANTI called [facility] ( [phone]) and spoke with ,  David Tate who reported [facility] ( [phone]) will NOT be involved at this time     PLAN    Baby CAN be discharged home when medically ready, children services will NOT be involved at this time.      Electronically signed by ROSANNA Kovacs on 9/13/2021 at 3:17 PM Not applicable

## 2024-08-20 ENCOUNTER — OFFICE VISIT (OUTPATIENT)
Dept: FAMILY MEDICINE CLINIC | Age: 29
End: 2024-08-20
Payer: COMMERCIAL

## 2024-08-20 VITALS
SYSTOLIC BLOOD PRESSURE: 114 MMHG | WEIGHT: 115 LBS | OXYGEN SATURATION: 98 % | TEMPERATURE: 98.5 F | DIASTOLIC BLOOD PRESSURE: 66 MMHG | RESPIRATION RATE: 18 BRPM | BODY MASS INDEX: 22.58 KG/M2 | HEART RATE: 79 BPM | HEIGHT: 60 IN

## 2024-08-20 DIAGNOSIS — R21 RASH AND NONSPECIFIC SKIN ERUPTION: Primary | ICD-10-CM

## 2024-08-20 PROCEDURE — G8427 DOCREV CUR MEDS BY ELIG CLIN: HCPCS | Performed by: FAMILY MEDICINE

## 2024-08-20 PROCEDURE — G8420 CALC BMI NORM PARAMETERS: HCPCS | Performed by: FAMILY MEDICINE

## 2024-08-20 PROCEDURE — 99213 OFFICE O/P EST LOW 20 MIN: CPT | Performed by: FAMILY MEDICINE

## 2024-08-20 PROCEDURE — 1036F TOBACCO NON-USER: CPT | Performed by: FAMILY MEDICINE

## 2024-08-20 RX ORDER — CLOTRIMAZOLE AND BETAMETHASONE DIPROPIONATE 10; .64 MG/G; MG/G
CREAM TOPICAL
Qty: 15 G | Refills: 0 | Status: SHIPPED | OUTPATIENT
Start: 2024-08-20

## 2024-08-20 SDOH — ECONOMIC STABILITY: FOOD INSECURITY: WITHIN THE PAST 12 MONTHS, THE FOOD YOU BOUGHT JUST DIDN'T LAST AND YOU DIDN'T HAVE MONEY TO GET MORE.: NEVER TRUE

## 2024-08-20 SDOH — ECONOMIC STABILITY: FOOD INSECURITY: WITHIN THE PAST 12 MONTHS, YOU WORRIED THAT YOUR FOOD WOULD RUN OUT BEFORE YOU GOT MONEY TO BUY MORE.: NEVER TRUE

## 2024-08-20 SDOH — ECONOMIC STABILITY: INCOME INSECURITY: HOW HARD IS IT FOR YOU TO PAY FOR THE VERY BASICS LIKE FOOD, HOUSING, MEDICAL CARE, AND HEATING?: NOT HARD AT ALL

## 2024-08-20 ASSESSMENT — ENCOUNTER SYMPTOMS
SINUS PAIN: 0
ALLERGIC/IMMUNOLOGIC NEGATIVE: 1
VOMITING: 0
SORE THROAT: 0
COUGH: 0
CHEST TIGHTNESS: 0
PHOTOPHOBIA: 0
BLOOD IN STOOL: 0
EYE REDNESS: 0
ABDOMINAL PAIN: 0
EYE PAIN: 0
COLOR CHANGE: 1
BACK PAIN: 0
ROS SKIN COMMENTS: LEFT CALF
NAUSEA: 0
TROUBLE SWALLOWING: 0
DIARRHEA: 0
SHORTNESS OF BREATH: 0
EYE DISCHARGE: 0

## 2024-08-20 ASSESSMENT — PATIENT HEALTH QUESTIONNAIRE - PHQ9
SUM OF ALL RESPONSES TO PHQ9 QUESTIONS 1 & 2: 0
1. LITTLE INTEREST OR PLEASURE IN DOING THINGS: NOT AT ALL
9. THOUGHTS THAT YOU WOULD BE BETTER OFF DEAD, OR OF HURTING YOURSELF: NOT AT ALL
3. TROUBLE FALLING OR STAYING ASLEEP: NOT AT ALL
5. POOR APPETITE OR OVEREATING: NOT AT ALL
SUM OF ALL RESPONSES TO PHQ QUESTIONS 1-9: 0
4. FEELING TIRED OR HAVING LITTLE ENERGY: NOT AT ALL
SUM OF ALL RESPONSES TO PHQ QUESTIONS 1-9: 0
SUM OF ALL RESPONSES TO PHQ QUESTIONS 1-9: 0
10. IF YOU CHECKED OFF ANY PROBLEMS, HOW DIFFICULT HAVE THESE PROBLEMS MADE IT FOR YOU TO DO YOUR WORK, TAKE CARE OF THINGS AT HOME, OR GET ALONG WITH OTHER PEOPLE: NOT DIFFICULT AT ALL
7. TROUBLE CONCENTRATING ON THINGS, SUCH AS READING THE NEWSPAPER OR WATCHING TELEVISION: NOT AT ALL
SUM OF ALL RESPONSES TO PHQ QUESTIONS 1-9: 0
8. MOVING OR SPEAKING SO SLOWLY THAT OTHER PEOPLE COULD HAVE NOTICED. OR THE OPPOSITE, BEING SO FIGETY OR RESTLESS THAT YOU HAVE BEEN MOVING AROUND A LOT MORE THAN USUAL: NOT AT ALL
6. FEELING BAD ABOUT YOURSELF - OR THAT YOU ARE A FAILURE OR HAVE LET YOURSELF OR YOUR FAMILY DOWN: NOT AT ALL
2. FEELING DOWN, DEPRESSED OR HOPELESS: NOT AT ALL

## 2024-08-20 NOTE — PROGRESS NOTES
24  Nemo Saleh : 1995 Sex: female  Age: 28 y.o.      Assessment and Plan:  Nemo was seen today for rash.    Diagnoses and all orders for this visit:    Rash and nonspecific skin eruption  -     clotrimazole-betamethasone (LOTRISONE) 1-0.05 % cream; Apply topically 2 times daily.    Will treat this nonspecific rash likely fungal dermatitis.  Lotrisone to the area twice daily for 5 to 7 days.  If complaints do not improve, or worsen in any way, present back to the office.    Return 5 to 7-day recheck if not improved.    Chief Complaint   Patient presents with    Rash     On L calf. Itching        Patient complains of circular ring type rash of her left thigh calf.  Area is irritated, pruritic.  Onset 3 to 4 weeks ago.  She denies any other satellite lesions.        Review of Systems   Constitutional:  Negative for appetite change, fatigue and unexpected weight change.   HENT:  Negative for congestion, ear pain, hearing loss, sinus pain, sore throat and trouble swallowing.    Eyes:  Negative for photophobia, pain, discharge and redness.   Respiratory:  Negative for cough, chest tightness and shortness of breath.    Cardiovascular:  Negative for chest pain, palpitations and leg swelling.   Gastrointestinal:  Negative for abdominal pain, blood in stool, diarrhea, nausea and vomiting.   Endocrine: Negative.    Genitourinary:  Negative for dysuria, flank pain, frequency and hematuria.   Musculoskeletal:  Negative for arthralgias, back pain, joint swelling and myalgias.   Skin:  Positive for color change and rash.        Left calf   Allergic/Immunologic: Negative.    Neurological:  Negative for dizziness, seizures, syncope, weakness, light-headedness, numbness and headaches.   Hematological:  Negative for adenopathy. Does not bruise/bleed easily.   Psychiatric/Behavioral: Negative.           Current Outpatient Medications:     clotrimazole-betamethasone (LOTRISONE) 1-0.05 % cream, Apply topically

## 2025-06-04 ENCOUNTER — OFFICE VISIT (OUTPATIENT)
Dept: FAMILY MEDICINE CLINIC | Age: 30
End: 2025-06-04
Payer: COMMERCIAL

## 2025-06-04 VITALS
HEIGHT: 60 IN | RESPIRATION RATE: 17 BRPM | WEIGHT: 127 LBS | BODY MASS INDEX: 24.94 KG/M2 | SYSTOLIC BLOOD PRESSURE: 124 MMHG | HEART RATE: 98 BPM | DIASTOLIC BLOOD PRESSURE: 80 MMHG | TEMPERATURE: 97.1 F | OXYGEN SATURATION: 98 %

## 2025-06-04 DIAGNOSIS — J02.9 SORE THROAT: Primary | ICD-10-CM

## 2025-06-04 LAB — S PYO AG THROAT QL: POSITIVE

## 2025-06-04 PROCEDURE — 99213 OFFICE O/P EST LOW 20 MIN: CPT | Performed by: FAMILY MEDICINE

## 2025-06-04 PROCEDURE — 87880 STREP A ASSAY W/OPTIC: CPT | Performed by: FAMILY MEDICINE

## 2025-06-04 RX ORDER — CEFDINIR 300 MG/1
300 CAPSULE ORAL 2 TIMES DAILY
Qty: 14 CAPSULE | Refills: 0 | Status: SHIPPED | OUTPATIENT
Start: 2025-06-04 | End: 2025-06-11

## 2025-06-04 NOTE — PROGRESS NOTES
OFFICE NOTE    25  Name: Nemo Saleh  :1995   Sex:female   Age:29 y.o.      SUBJECTIVE  Chief Complaint   Patient presents with    Sore Throat    Congestion    Generalized Body Aches    Fever       History of Present Illness  The patient presents for evaluation of a sore throat.    She reports a persistent sore throat, which appears to be more severe during the night. She has been managing the discomfort with Tylenol, but notes that it has not been as effective today as it was previously. The patient mentions that her throat looks red on the side, likely from drainage. She has no known allergies.       Review of Systems   No fever, ears and sinus feel full. PND. Non-productive cough      Current Outpatient Medications:     cefdinir (OMNICEF) 300 MG capsule, Take 1 capsule by mouth 2 times daily for 7 days, Disp: 14 capsule, Rfl: 0    clotrimazole-betamethasone (LOTRISONE) 1-0.05 % cream, Apply topically 2 times daily. (Patient not taking: Reported on 2025), Disp: 15 g, Rfl: 0    ibuprofen (ADVIL;MOTRIN) 600 MG tablet, Take 1 tablet by mouth every 6 hours as needed for Pain (Patient not taking: Reported on 2025), Disp: 120 tablet, Rfl: 0    NIFEdipine (ADALAT CC) 30 MG extended release tablet, Take 2 tablets by mouth daily (Patient not taking: Reported on 2025), Disp: 60 tablet, Rfl: 0    ferrous sulfate (IRON 325) 325 (65 Fe) MG tablet, Take 1 tablet by mouth 2 times daily (with meals) (Patient not taking: Reported on 2025), Disp: 60 tablet, Rfl: 0    Prenatal MV-Min-Fe Fum-FA-DHA (PRENATAL 1 PO), Take by mouth (Patient not taking: Reported on 2025), Disp: , Rfl:   No Known Allergies    Past Medical History:   Diagnosis Date    Anxiety     Benign brain tumor (HCC)     gets annual MRI    Depression     Herpes      Past Surgical History:   Procedure Laterality Date    ADENOIDECTOMY      TONSILLECTOMY       Family History   Problem Relation Age of Onset    Stroke Mother     Heart